# Patient Record
Sex: FEMALE | Race: WHITE | NOT HISPANIC OR LATINO | Employment: FULL TIME | URBAN - NONMETROPOLITAN AREA
[De-identification: names, ages, dates, MRNs, and addresses within clinical notes are randomized per-mention and may not be internally consistent; named-entity substitution may affect disease eponyms.]

---

## 2017-03-17 ENCOUNTER — OFFICE VISIT (OUTPATIENT)
Dept: INTERNAL MEDICINE | Facility: CLINIC | Age: 37
End: 2017-03-17

## 2017-03-17 VITALS
TEMPERATURE: 99 F | HEIGHT: 66 IN | DIASTOLIC BLOOD PRESSURE: 66 MMHG | HEART RATE: 78 BPM | OXYGEN SATURATION: 98 % | WEIGHT: 186 LBS | SYSTOLIC BLOOD PRESSURE: 118 MMHG | BODY MASS INDEX: 29.89 KG/M2

## 2017-03-17 DIAGNOSIS — R42 DIZZINESS: Primary | ICD-10-CM

## 2017-03-17 DIAGNOSIS — R13.12 OROPHARYNGEAL DYSPHAGIA: ICD-10-CM

## 2017-03-17 DIAGNOSIS — J30.2 SEASONAL ALLERGIC RHINITIS, UNSPECIFIED ALLERGIC RHINITIS TRIGGER: ICD-10-CM

## 2017-03-17 PROCEDURE — 99203 OFFICE O/P NEW LOW 30 MIN: CPT | Performed by: INTERNAL MEDICINE

## 2017-03-17 PROCEDURE — 93000 ELECTROCARDIOGRAM COMPLETE: CPT | Performed by: INTERNAL MEDICINE

## 2017-03-17 NOTE — PROGRESS NOTES
"Chief Complaint   Patient presents with   • Establish Care     with Dr. Vermeesch today. Pt states she's been having some dizzy spells and moments she is unable to swallow.      Subjective   Xuan Baron is a 36 y.o. female.     HPI Comments: Patient is here to be established today.  Past medical history of anemia, ovarian cyst and kidney infection.  Past surgical history of cervical ablation in , partial hysterectomy with ovaries in place in May 2016.  SH, FH, meds and allergies reviewed.   HCM:  Eye exam in , Pap smear in May 2016.  She has been feeling some dizziness lately.  Had an episode of feeling fuzzy as though she may pass out at work.  Episode lasted a few minutes.   This was after breakfast.  She started to lose focus and felt she may pass out. There was no associated HA, CP or SOB.  No palpitations.   This has occurred about 5 times in past two months.  Of note, her mother  of myocardial infarction at age 64.  On occasion while eating she starts to have difficulty swallowing.  It occurs with solids and liquids.  It does not feel like food is \"stuck\", it feels like her muscles are not moving to swallow.   She has been having LBP for about a yr.  Has had no trauma.  It often awakens her at night, worse than during the day.    She does get recurrent UTIs, these tend to run in the family.  Has left lateral breast tenderness.  She cannot palpate a mass.  There is no family history of breast cancer.  She has never had a mammogram.  She drinks 4-5 cups of coffee a day.         The following portions of the patient's history were reviewed and updated as appropriate: allergies, current medications, past family history, past medical history, past social history, past surgical history and problem list.    Review of Systems   Constitutional: Negative for unexpected weight change.   HENT: Positive for congestion, postnasal drip and trouble swallowing.    Respiratory: Negative for chest tightness and " "shortness of breath.    Cardiovascular: Negative for chest pain and palpitations.   Endocrine:        Left lateral breast tenderness   Genitourinary:        Recurrent urinary tract infections   Musculoskeletal: Positive for back pain.   Allergic/Immunologic: Positive for environmental allergies.   Neurological: Positive for dizziness. Negative for seizures, syncope, speech difficulty, weakness and headaches.        Near syncope   All other systems reviewed and are negative.      Objective   Visit Vitals   • /66   • Pulse 78   • Temp 99 °F (37.2 °C)   • Ht 66\" (167.6 cm)   • Wt 186 lb (84.4 kg)   • SpO2 98%   • BMI 30.02 kg/m2     Body mass index is 30.02 kg/(m^2).  Physical Exam   Constitutional: She is oriented to person, place, and time. She appears well-developed and well-nourished.   HENT:   Head: Normocephalic and atraumatic.   Mouth/Throat: Oropharynx is clear and moist.   Tympanic membranes dull bilaterally, oropharynx with clear postnasal drip   Eyes: Conjunctivae and EOM are normal. Pupils are equal, round, and reactive to light.   Neck: Normal range of motion. Neck supple. No thyromegaly present.   Cardiovascular: Normal rate, regular rhythm, normal heart sounds and intact distal pulses.    No murmur heard.  Pulmonary/Chest: Effort normal and breath sounds normal. No respiratory distress.   Abdominal: Soft. Bowel sounds are normal.   Musculoskeletal: She exhibits no edema.   Lymphadenopathy:     She has no cervical adenopathy.   Neurological: She is alert and oriented to person, place, and time. She has normal reflexes. No cranial nerve deficit.   Muscle strength normal in upper and lower extremities   Psychiatric: She has a normal mood and affect. Her behavior is normal. Judgment and thought content normal.   Nursing note and vitals reviewed.      ECG 12 Lead  Date/Time: 3/17/2017 5:26 PM  Performed by: VERMEESCH, MARILYN K  Authorized by: VERMEESCH, MARILYN K   Rhythm: sinus rhythm  Rate: " normal  BPM: 75  Conduction: conduction normal  ST Segments: ST segments normal  T Waves: T waves normal  QRS axis: normal  Other: no other findings  Clinical impression: normal ECG            Assessment/Plan   Xuan Baron is here today and the following problems have been addressed:      Xuan was seen today for establish care.    Diagnoses and all orders for this visit:    Dizziness  -     CBC & Differential; Future  -     Comprehensive Metabolic Panel; Future  -     TSH; Future    Oropharyngeal dysphagia  -     CBC & Differential; Future  -     Comprehensive Metabolic Panel; Future  -     TSH; Future  -     FL upper gi single contrast wo kub; Future    Seasonal allergic rhinitis, unspecified allergic rhinitis trigger      Labs as noted  Upper GI to evaluate dysphagia  EKG is unremarkable  Recommend patient start Zyrtec 10 mg by mouth daily at bedtime due to congestion in ears from allergies  Will address left lateral breast pain at next visit and do possible mammogram  At this time recommend patient decrease amount of caffeine she is consuming    Return to clinic in 2 weeks    Please note that portions of this note were completed with a voice recognition program.  Efforts were made to edit dictation, but occasionally words are mistranscribed.

## 2017-03-20 ENCOUNTER — LAB (OUTPATIENT)
Dept: INTERNAL MEDICINE | Facility: CLINIC | Age: 37
End: 2017-03-20

## 2017-03-20 DIAGNOSIS — R13.12 OROPHARYNGEAL DYSPHAGIA: ICD-10-CM

## 2017-03-20 DIAGNOSIS — R42 DIZZINESS: ICD-10-CM

## 2017-03-20 LAB
ALBUMIN SERPL-MCNC: 4.2 G/DL (ref 3.2–4.8)
ALBUMIN/GLOB SERPL: 1.7 G/DL (ref 1.5–2.5)
ALP SERPL-CCNC: 50 U/L (ref 25–100)
ALT SERPL W P-5'-P-CCNC: 21 U/L (ref 7–40)
ANION GAP SERPL CALCULATED.3IONS-SCNC: 3 MMOL/L (ref 3–11)
AST SERPL-CCNC: 20 U/L (ref 0–33)
BASOPHILS # BLD AUTO: 0.01 10*3/MM3 (ref 0–0.2)
BASOPHILS NFR BLD AUTO: 0.2 % (ref 0–1)
BILIRUB SERPL-MCNC: 0.3 MG/DL (ref 0.3–1.2)
BUN BLD-MCNC: 10 MG/DL (ref 9–23)
BUN/CREAT SERPL: 12.5 (ref 7–25)
CALCIUM SPEC-SCNC: 9.2 MG/DL (ref 8.7–10.4)
CHLORIDE SERPL-SCNC: 110 MMOL/L (ref 99–109)
CO2 SERPL-SCNC: 27 MMOL/L (ref 20–31)
CREAT BLD-MCNC: 0.8 MG/DL (ref 0.6–1.3)
DEPRECATED RDW RBC AUTO: 40.6 FL (ref 37–54)
EOSINOPHIL # BLD AUTO: 0.1 10*3/MM3 (ref 0.1–0.3)
EOSINOPHIL NFR BLD AUTO: 2.3 % (ref 0–3)
ERYTHROCYTE [DISTWIDTH] IN BLOOD BY AUTOMATED COUNT: 13.1 % (ref 11.3–14.5)
GFR SERPL CREATININE-BSD FRML MDRD: 81 ML/MIN/1.73
GLOBULIN UR ELPH-MCNC: 2.5 GM/DL
GLUCOSE BLD-MCNC: 85 MG/DL (ref 70–100)
HCT VFR BLD AUTO: 38.3 % (ref 34.5–44)
HGB BLD-MCNC: 12.7 G/DL (ref 11.5–15.5)
IMM GRANULOCYTES # BLD: 0.01 10*3/MM3 (ref 0–0.03)
IMM GRANULOCYTES NFR BLD: 0.2 % (ref 0–0.6)
LYMPHOCYTES # BLD AUTO: 1.6 10*3/MM3 (ref 0.6–4.8)
LYMPHOCYTES NFR BLD AUTO: 37.5 % (ref 24–44)
MCH RBC QN AUTO: 28.2 PG (ref 27–31)
MCHC RBC AUTO-ENTMCNC: 33.2 G/DL (ref 32–36)
MCV RBC AUTO: 85.1 FL (ref 80–99)
MONOCYTES # BLD AUTO: 0.4 10*3/MM3 (ref 0–1)
MONOCYTES NFR BLD AUTO: 9.4 % (ref 0–12)
NEUTROPHILS # BLD AUTO: 2.15 10*3/MM3 (ref 1.5–8.3)
NEUTROPHILS NFR BLD AUTO: 50.4 % (ref 41–71)
PLATELET # BLD AUTO: 179 10*3/MM3 (ref 150–450)
PMV BLD AUTO: 10.9 FL (ref 6–12)
POTASSIUM BLD-SCNC: 4.5 MMOL/L (ref 3.5–5.5)
PROT SERPL-MCNC: 6.7 G/DL (ref 5.7–8.2)
RBC # BLD AUTO: 4.5 10*6/MM3 (ref 3.89–5.14)
SODIUM BLD-SCNC: 140 MMOL/L (ref 132–146)
TSH SERPL DL<=0.05 MIU/L-ACNC: 1.88 MIU/ML (ref 0.35–5.35)
WBC NRBC COR # BLD: 4.27 10*3/MM3 (ref 3.5–10.8)

## 2017-03-20 PROCEDURE — 80053 COMPREHEN METABOLIC PANEL: CPT | Performed by: INTERNAL MEDICINE

## 2017-03-20 PROCEDURE — 84443 ASSAY THYROID STIM HORMONE: CPT | Performed by: INTERNAL MEDICINE

## 2017-03-20 PROCEDURE — 85025 COMPLETE CBC W/AUTO DIFF WBC: CPT | Performed by: INTERNAL MEDICINE

## 2017-03-20 PROCEDURE — 36415 COLL VENOUS BLD VENIPUNCTURE: CPT | Performed by: INTERNAL MEDICINE

## 2017-03-22 ENCOUNTER — HOSPITAL ENCOUNTER (OUTPATIENT)
Dept: GENERAL RADIOLOGY | Facility: HOSPITAL | Age: 37
Discharge: HOME | End: 2017-03-22
Admitting: INTERNAL MEDICINE

## 2017-03-22 ENCOUNTER — TELEPHONE (OUTPATIENT)
Dept: INTERNAL MEDICINE | Facility: CLINIC | Age: 37
End: 2017-03-22

## 2017-03-22 DIAGNOSIS — R13.12 OROPHARYNGEAL DYSPHAGIA: ICD-10-CM

## 2017-03-22 PROCEDURE — 74240 X-RAY XM UPR GI TRC 1CNTRST: CPT

## 2017-03-22 NOTE — TELEPHONE ENCOUNTER
----- Message from Marilyn K Vermeesch, MD sent at 3/22/2017 12:19 PM EDT -----  Please tell patient up her GI reveals mild GERD symptoms.  I would like her to take over-the-counter Zantac 150 mg once daily at suppertime.

## 2017-03-31 ENCOUNTER — OFFICE VISIT (OUTPATIENT)
Dept: INTERNAL MEDICINE | Facility: CLINIC | Age: 37
End: 2017-03-31

## 2017-03-31 VITALS
SYSTOLIC BLOOD PRESSURE: 116 MMHG | DIASTOLIC BLOOD PRESSURE: 68 MMHG | OXYGEN SATURATION: 94 % | HEART RATE: 68 BPM | WEIGHT: 185.38 LBS | TEMPERATURE: 98.1 F | BODY MASS INDEX: 29.79 KG/M2 | HEIGHT: 66 IN

## 2017-03-31 DIAGNOSIS — K21.9 GASTROESOPHAGEAL REFLUX DISEASE WITHOUT ESOPHAGITIS: Primary | ICD-10-CM

## 2017-03-31 PROCEDURE — 99213 OFFICE O/P EST LOW 20 MIN: CPT | Performed by: INTERNAL MEDICINE

## 2017-03-31 RX ORDER — RANITIDINE 150 MG/1
150 TABLET ORAL 2 TIMES DAILY
Start: 2017-03-31 | End: 2018-06-07

## 2017-03-31 NOTE — PROGRESS NOTES
"Chief Complaint   Patient presents with   • Follow-up     2 weeks for dizziness and trouble swallowing. Pt states there hasn't been any change.      Subjective   Xuan Baron is a 36 y.o. female.     HPI Comments: Here for follow-up of dizziness and intermittent difficulty with swallowing.  Upper GI revealed mild GERD  to distal esophagus.  She cannot really tell if she has any improvement with her swallow, has only been on the zantac for a week.  She has had an occasional episode of feeling as though the floor moves when she initially stands.  This may last for a few minutes.  No associated sxs when this occurs.  She does not feel bad afterwards.   Has been taking antihistamine on and off.        The following portions of the patient's history were reviewed and updated as appropriate: allergies, current medications, past family history, past medical history, past social history, past surgical history and problem list.    Review of Systems   HENT: Positive for trouble swallowing.    Allergic/Immunologic: Positive for environmental allergies.   Neurological: Positive for light-headedness.       Objective   /68  Pulse 68  Temp 98.1 °F (36.7 °C)  Ht 66\" (167.6 cm)  Wt 185 lb 6 oz (84.1 kg)  SpO2 94%  BMI 29.92 kg/m2  Body mass index is 29.92 kg/(m^2).  Physical Exam   Constitutional: She is oriented to person, place, and time. She appears well-developed and well-nourished.   HENT:   Head: Normocephalic and atraumatic.   Eyes: EOM are normal. Pupils are equal, round, and reactive to light.   Neurological: She is alert and oriented to person, place, and time.   Psychiatric: She has a normal mood and affect. Judgment normal.   Nursing note and vitals reviewed.      Assessment/Plan   Xuan Baron is here today and the following problems have been addressed:      Xuan was seen today for follow-up.    Diagnoses and all orders for this visit:    Gastroesophageal reflux disease without esophagitis    Other " "orders  -     raNITIdine (ZANTAC) 150 MG tablet; Take 1 tablet by mouth 2 (Two) Times a Day.    Continue zantac 150 mg q day  Avoid late night meals, spicy food to prevent worsening GERD sxs  She is having less episodes of \"dizziness\"/lightheadedness- will monitor for now  Continue when necessary antihistamine for allergies if needed    RTC 2 mo  Please note that portions of this note were completed with a voice recognition program.  Efforts were made to edit dictation, but occasionally words are mistranscribed.  "

## 2017-04-20 ENCOUNTER — OFFICE VISIT (OUTPATIENT)
Dept: INTERNAL MEDICINE | Facility: CLINIC | Age: 37
End: 2017-04-20

## 2017-04-20 ENCOUNTER — RESULTS ENCOUNTER (OUTPATIENT)
Dept: INTERNAL MEDICINE | Facility: CLINIC | Age: 37
End: 2017-04-20

## 2017-04-20 VITALS — OXYGEN SATURATION: 98 % | HEART RATE: 78 BPM | TEMPERATURE: 98.7 F

## 2017-04-20 DIAGNOSIS — R39.9 URINARY SYMPTOM OR SIGN: Primary | ICD-10-CM

## 2017-04-20 DIAGNOSIS — N30.00 ACUTE CYSTITIS WITHOUT HEMATURIA: ICD-10-CM

## 2017-04-20 LAB
BILIRUB BLD-MCNC: ABNORMAL MG/DL
CLARITY, POC: CLEAR
COLOR UR: ABNORMAL
GLUCOSE UR STRIP-MCNC: NEGATIVE MG/DL
KETONES UR QL: NEGATIVE
LEUKOCYTE EST, POC: NEGATIVE
NITRITE UR-MCNC: POSITIVE MG/ML
PH UR: 7 [PH] (ref 5–8)
PROT UR STRIP-MCNC: ABNORMAL MG/DL
RBC # UR STRIP: NEGATIVE /UL
SP GR UR: 1.01 (ref 1–1.03)
UROBILINOGEN UR QL: ABNORMAL

## 2017-04-20 PROCEDURE — 99213 OFFICE O/P EST LOW 20 MIN: CPT | Performed by: INTERNAL MEDICINE

## 2017-04-20 PROCEDURE — 81003 URINALYSIS AUTO W/O SCOPE: CPT | Performed by: INTERNAL MEDICINE

## 2017-04-20 RX ORDER — NITROFURANTOIN 25; 75 MG/1; MG/1
100 CAPSULE ORAL 2 TIMES DAILY
Qty: 14 CAPSULE | Refills: 0 | Status: SHIPPED | OUTPATIENT
Start: 2017-04-20 | End: 2017-07-03 | Stop reason: SDUPTHER

## 2017-04-20 NOTE — PROGRESS NOTES
Chief Complaint   Patient presents with   • Urinary Tract Infection     Pt states she been having some abdominal pressure and burning with urination.      Subjective   Xuan Baron is a 36 y.o. female.     HPI Comments: Here with complaints of abdominal pressure and pain with urination.   She has had pressure and burning for about one day.  She has had some chills and body aches.  No back pain.  Some diarrhea.  No NV.   She had the flu last week.  She had a hysterectomy a yr ago.  Since then she has had a UTI every other month.  She has always had a lot of UTIs.  This runs in the women in her family.       The following portions of the patient's history were reviewed and updated as appropriate: allergies, current medications, past family history, past medical history, past social history, past surgical history and problem list.    Review of Systems   Constitutional: Positive for chills. Negative for fever.   Gastrointestinal: Positive for abdominal pain and diarrhea.   Genitourinary: Positive for difficulty urinating and dysuria.   Musculoskeletal: Positive for myalgias.       Objective   Pulse 78  Temp 98.7 °F (37.1 °C)  SpO2 98%  There is no height or weight on file to calculate BMI.  Physical Exam   Constitutional: She is oriented to person, place, and time. She appears well-developed and well-nourished.   HENT:   Head: Normocephalic and atraumatic.   Cardiovascular: Normal rate, regular rhythm and normal heart sounds.    Pulmonary/Chest: Effort normal and breath sounds normal.   Abdominal: Soft. Bowel sounds are normal.   Suprapubic tenderness, no CVAT   Neurological: She is alert and oriented to person, place, and time.   Psychiatric: She has a normal mood and affect. Judgment normal.   Nursing note and vitals reviewed.      Assessment/Plan   Xuan Baron is here today and the following problems have been addressed:      Xuan was seen today for urinary tract infection.    Diagnoses and all orders for this  visit:    Urinary symptom or sign  -     POCT urinalysis dipstick, automated    Acute cystitis without hematuria  -     Urine culture (clean catch); Future  -     Ambulatory Referral to Urology    Other orders  -     nitrofurantoin, macrocrystal-monohydrate, (MACROBID) 100 MG capsule; Take 1 capsule by mouth 2 (Two) Times a Day.    UA with positive nitrites, UC sent  Increase fluids and rest  Given macrobid for 7 days  Referred to urologist due to recurrent UTI    RTC prn  Please note that portions of this note were completed with a voice recognition program.  Efforts were made to edit dictation, but occasionally words are mistranscribed.

## 2017-04-23 LAB
BACTERIA UR CULT: NORMAL
BACTERIA UR CULT: NORMAL

## 2017-06-19 ENCOUNTER — OFFICE VISIT (OUTPATIENT)
Dept: UROLOGY | Facility: CLINIC | Age: 37
End: 2017-06-19

## 2017-06-19 VITALS
BODY MASS INDEX: 29.41 KG/M2 | HEIGHT: 66 IN | WEIGHT: 183 LBS | HEART RATE: 64 BPM | TEMPERATURE: 98.1 F | DIASTOLIC BLOOD PRESSURE: 65 MMHG | SYSTOLIC BLOOD PRESSURE: 102 MMHG | OXYGEN SATURATION: 96 %

## 2017-06-19 DIAGNOSIS — N39.0 RECURRENT UTI: Primary | ICD-10-CM

## 2017-06-19 DIAGNOSIS — N32.81 OVERACTIVE BLADDER: ICD-10-CM

## 2017-06-19 LAB
BILIRUB BLD-MCNC: NEGATIVE MG/DL
CLARITY, POC: CLEAR
COLOR UR: YELLOW
GLUCOSE UR STRIP-MCNC: NEGATIVE MG/DL
KETONES UR QL: NEGATIVE
LEUKOCYTE EST, POC: NEGATIVE
NITRITE UR-MCNC: NEGATIVE MG/ML
PH UR: 7 [PH] (ref 5–8)
PROT UR STRIP-MCNC: NEGATIVE MG/DL
RBC # UR STRIP: NEGATIVE /UL
SP GR UR: 1.01 (ref 1–1.03)
UROBILINOGEN UR QL: NORMAL

## 2017-06-19 PROCEDURE — 99203 OFFICE O/P NEW LOW 30 MIN: CPT | Performed by: UROLOGY

## 2017-06-19 PROCEDURE — 52265 CYSTOSCOPY AND TREATMENT: CPT | Performed by: UROLOGY

## 2017-06-19 PROCEDURE — 81003 URINALYSIS AUTO W/O SCOPE: CPT | Performed by: UROLOGY

## 2017-06-19 RX ORDER — PREDNISOLONE ACETATE 10 MG/ML
SUSPENSION/ DROPS OPHTHALMIC
Refills: 0 | COMMUNITY
Start: 2017-06-16 | End: 2018-06-07

## 2017-06-19 RX ORDER — PHENAZOPYRIDINE HYDROCHLORIDE 200 MG/1
TABLET, FILM COATED ORAL
Refills: 0 | COMMUNITY
Start: 2017-06-09 | End: 2017-07-03 | Stop reason: SDUPTHER

## 2017-06-19 NOTE — PROGRESS NOTES
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  Its not clear of any of these were documented but a culture done in April was basically negative on voided urine sample.  She has the typical symptoms of frequency urgency and dysuria that are typically treated with antibiotics but not documented with cultures.  She does admit to drinking a fair amount of caffeine with 2 cups of coffee in the morning 2 glasses of tea in the evening and drinking lots of water.  Discomfort and occasionally after intercourse.  She denies any gross hematuria back pain or febrile infections that would indicate upper tract involvement.  She states she underwent a hysterectomy by Dr. Swenson for pelvic pain although it hasn't helped that much.  He apparently did a bladder tack at the time but she continues to have stress incontinence when she is running.    Vitals:    06/19/17 1517   BP: 102/65   Pulse: 64   Temp: 98.1 °F (36.7 °C)   SpO2: 96%       Past Medical History  Past Medical History:   Diagnosis Date   • Anemia    • Kidney infection    • Ovarian cyst        Past Surgical History  Past Surgical History:   Procedure Laterality Date   • LASER ABLATION OF THE CERVIX     • PARTIAL HYSTERECTOMY         Medications  has a current medication list which includes the following prescription(s): nitrofurantoin (macrocrystal-monohydrate), phenazopyridine, prednisolone acetate, and ranitidine.    Allergies  No Known Allergies    Social History  Social History     Social History   • Marital status:      Spouse name: N/A   • Number of children: N/A   • Years of education: N/A     Occupational History   •  [employer]     Social History Main Topics   • Smoking status: Never Smoker   • Smokeless tobacco: Never Used   • Alcohol use No    • Drug use: No   • Sexual activity: Defer     Other Topics Concern   • Not on file     Social History Narrative       Family History  Family History   Problem Relation Age of Onset   • Heart attack Mother    • Stroke Mother    • Thyroid disease Mother    • Diabetes Father    • Heart attack Father    • Hypertension Father    • Obesity Father    • Migraines Son        Review of Systems  Review of Systems  Positive for frequency urgency but negative in all other categories.  Physical Exam  Physical Exam   Constitutional: She is oriented to person, place, and time. She appears well-developed and well-nourished.   HENT:   Head: Normocephalic.   Eyes: EOM are normal. Pupils are equal, round, and reactive to light.   Neck: Normal range of motion. Neck supple.   Cardiovascular: Normal rate, regular rhythm and normal heart sounds.    Pulmonary/Chest: Effort normal.   Abdominal: Soft. Bowel sounds are normal. Hernia confirmed negative in the right inguinal area and confirmed negative in the left inguinal area.   Genitourinary: Vagina normal. Pelvic exam was performed with patient supine.       Lymphadenopathy:        Right: No inguinal adenopathy present.        Left: No inguinal adenopathy present.   Neurological: She is alert and oriented to person, place, and time.   Skin: Skin is warm and dry.   Psychiatric: She has a normal mood and affect.       Labs recent and today in the office:  Results for orders placed or performed in visit on 06/19/17   POC Urinalysis Dipstick, Automated   Result Value Ref Range    Color Yellow Yellow, Straw, Dark Yellow, Anayeli    Clarity, UA Clear Clear    Glucose, UA Negative Negative, 1000 mg/dL (3+) mg/dL    Bilirubin Negative Negative    Ketones, UA Negative Negative    Specific Gravity  1.015 1.005 - 1.030    Blood, UA Negative Negative    pH, Urine 7.0 5.0 - 8.0    Protein, POC Negative Negative mg/dL    Urobilinogen, UA Normal Normal    Leukocytes Negative Negative    Nitrite, UA  Negative Negative      Female cystoscopy:     The patient is prepped and draped in the dorsal lithotomy position in a routine sterile fashion. The vulva and urethral meatus are inspected and found to be normal. The panendoscope is inserted in the bladder which is visualized with the Foroblique and 70 degree lenses and found to be free of foreign bodies and mucosal lesions. Ureteral orifices are normal location and effluxing clear urine bilaterally.  There are only 2 ounces of postvoid residual.  The bladder is distended to 500 mL and a Tim test is negative with vigorous coughing.  On reinspection there are no signs of interstitial cystitis stones polyps or diverticula.  Pelvic exam is post hysterectomy and benign.    Assessment & Plan  I suspected interstitial cystitis but there are no signs of it on office cystoscopy.  Pelvic exam is negative for inflammation masses or even tenderness.  She has only 1 ounce of postvoid residual so there is really no signs of pathology with the current data.  I suggested she bring a urine specimen by anytime that she suspects an infection so we can build the portfolio of data.  If bacterial infections can be documented she would be a good candidate for chronic suppression with Macrodantin.  For now I recommend caffeine restriction and reducing fluid intake near bedtime.

## 2017-07-03 ENCOUNTER — LAB REQUISITION (OUTPATIENT)
Dept: LAB | Facility: HOSPITAL | Age: 37
End: 2017-07-03

## 2017-07-03 DIAGNOSIS — N39.0 URINARY TRACT INFECTION: ICD-10-CM

## 2017-07-03 DIAGNOSIS — N30.00 ACUTE CYSTITIS WITHOUT HEMATURIA: Primary | ICD-10-CM

## 2017-07-03 PROCEDURE — 87086 URINE CULTURE/COLONY COUNT: CPT | Performed by: UROLOGY

## 2017-07-03 RX ORDER — PHENAZOPYRIDINE HYDROCHLORIDE 200 MG/1
200 TABLET, FILM COATED ORAL 3 TIMES DAILY PRN
Qty: 30 TABLET | Refills: 1 | Status: SHIPPED | OUTPATIENT
Start: 2017-07-03 | End: 2018-06-07

## 2017-07-03 RX ORDER — NITROFURANTOIN 25; 75 MG/1; MG/1
100 CAPSULE ORAL 2 TIMES DAILY
Qty: 14 CAPSULE | Refills: 0 | Status: SHIPPED | OUTPATIENT
Start: 2017-07-03 | End: 2018-06-07

## 2017-07-05 LAB — BACTERIA SPEC AEROBE CULT: NO GROWTH

## 2018-07-09 ENCOUNTER — HOSPITAL ENCOUNTER (OUTPATIENT)
Dept: GENERAL RADIOLOGY | Facility: HOSPITAL | Age: 38
Discharge: HOME | End: 2018-07-09
Admitting: PHYSICIAN ASSISTANT

## 2018-07-09 ENCOUNTER — OFFICE VISIT (OUTPATIENT)
Dept: INTERNAL MEDICINE | Facility: CLINIC | Age: 38
End: 2018-07-09

## 2018-07-09 VITALS
OXYGEN SATURATION: 99 % | BODY MASS INDEX: 25.07 KG/M2 | TEMPERATURE: 98.5 F | HEIGHT: 66 IN | DIASTOLIC BLOOD PRESSURE: 70 MMHG | HEART RATE: 90 BPM | WEIGHT: 156 LBS | SYSTOLIC BLOOD PRESSURE: 114 MMHG

## 2018-07-09 DIAGNOSIS — M53.3 CHRONIC SI JOINT PAIN: Primary | ICD-10-CM

## 2018-07-09 DIAGNOSIS — M54.50 LOW BACK PAIN AT MULTIPLE SITES: Primary | ICD-10-CM

## 2018-07-09 DIAGNOSIS — G89.29 CHRONIC SI JOINT PAIN: Primary | ICD-10-CM

## 2018-07-09 PROCEDURE — 99213 OFFICE O/P EST LOW 20 MIN: CPT | Performed by: PHYSICIAN ASSISTANT

## 2018-07-09 PROCEDURE — 72114 X-RAY EXAM L-S SPINE BENDING: CPT

## 2018-07-09 RX ORDER — FEXOFENADINE HCL 180 MG/1
180 TABLET ORAL DAILY
Refills: 0 | COMMUNITY
Start: 2018-04-08 | End: 2020-07-13

## 2018-07-09 RX ORDER — NAPROXEN 500 MG/1
500 TABLET ORAL 2 TIMES DAILY WITH MEALS
COMMUNITY
End: 2020-07-13

## 2018-07-09 NOTE — PROGRESS NOTES
Xuan Baron is a 38 y.o. female.     Subjective   History of Present Illness   Here today with concern of several years of sudden intermittent low back pain triggered by rolling over in bed. She describes the pain as sharp and debilitating until it resolves after a minute or so.    A few weeks ago she increased her exercise content and noticed some increased constant low back discomfort which she contributed to muscle soreness. About 1 week ago after doing yard work she had pain with standing straight up which has been constant for 1 week. Transitioning from sitting to standing causes increased pain into her sacrum. Pain radiates to bilateral low back, worse on right. Pain does not radiate to her legs and she denies numbness, tingling or weakness in her legs.         The following portions of the patient's history were reviewed and updated as appropriate: allergies, current medications, past family history, past medical history, past social history, past surgical history and problem list.    Review of Systems    Constitutional: Negative for appetite change, chills, fatigue, fever and unexpected weight change.   HENT: Negative for congestion, ear pain, hearing loss, nosebleeds, postnasal drip, rhinorrhea, sore throat, tinnitus and trouble swallowing.    Eyes: Negative for photophobia, discharge and visual disturbance.   Respiratory: Negative for cough, chest tightness, shortness of breath and wheezing.    Cardiovascular: Negative for chest pain, palpitations and leg swelling.   Gastrointestinal: Negative for abdominal distention, abdominal pain, blood in stool, constipation, diarrhea, nausea and vomiting.   Endocrine: Negative for cold intolerance, heat intolerance, polydipsia, polyphagia and polyuria.   Musculoskeletal: back pain. Negative for arthralgias, joint swelling, myalgias, neck pain and neck stiffness.   Skin: Negative for color change, pallor, rash and wound.   Allergic/Immunologic: Negative for  "environmental allergies, food allergies and immunocompromised state.   Neurological: Negative for dizziness, tremors, seizures, weakness, numbness and headaches.   Hematological: Negative for adenopathy. Does not bruise/bleed easily.   Psychiatric/Behavioral: Negative for sleep disturbances, agitation, behavioral problems, confusion, hallucinations, self-injury and suicidal ideas. The patient is not nervous/anxious.      Objective    Physical Exam  Constitutional: No acute distress. Appears well-developed and well-nourished.    Cardiovascular: Normal rate, regular rhythm and normal heart sounds.    Pulmonary/Chest: Effort normal and breath sounds normal. No respiratory distress.  Has no wheezes or rales. Exhibits no chest wall tenderness.    Musculoskeletal: tenderness bilateral SI joints and slight tenderness L3-S1 vertebrae. Normal range of motion.  Neurological: Negative straight leg raise bilaterally. Alert and oriented to person, place, and time.   Skin: Skin is warm and dry.   Psychiatric: Has a normal mood and affect. Behavior is normal. Judgment and thought content normal.       /70   Pulse 90   Temp 98.5 °F (36.9 °C)   Ht 166.4 cm (65.51\")   Wt 70.8 kg (156 lb)   SpO2 99%   BMI 25.56 kg/m²     Nursing note and vitals reviewed.          Assessment/Plan   Xuan was seen today for back pain.    Diagnoses and all orders for this visit:    Low back pain at multiple sites  -     XR Spine Lumbar Complete With Flex & Ext      Suspect SI joint dysfunction and/or arthritis. Will refer to orthopedic surgeon to discuss potential SI joint injections if warranted after review of x-rays.          "

## 2019-01-22 ENCOUNTER — OFFICE VISIT (OUTPATIENT)
Dept: INTERNAL MEDICINE | Facility: CLINIC | Age: 39
End: 2019-01-22

## 2019-01-22 VITALS
WEIGHT: 144 LBS | HEART RATE: 68 BPM | HEIGHT: 66 IN | OXYGEN SATURATION: 99 % | DIASTOLIC BLOOD PRESSURE: 72 MMHG | SYSTOLIC BLOOD PRESSURE: 120 MMHG | TEMPERATURE: 99.8 F | BODY MASS INDEX: 23.14 KG/M2

## 2019-01-22 DIAGNOSIS — J30.2 SEASONAL ALLERGIC RHINITIS, UNSPECIFIED TRIGGER: ICD-10-CM

## 2019-01-22 DIAGNOSIS — R06.83 SNORING: ICD-10-CM

## 2019-01-22 DIAGNOSIS — Z00.00 PHYSICAL EXAM: Primary | ICD-10-CM

## 2019-01-22 DIAGNOSIS — I73.00 RAYNAUD'S PHENOMENON WITHOUT GANGRENE: ICD-10-CM

## 2019-01-22 PROBLEM — R42 DIZZINESS: Status: RESOLVED | Noted: 2017-03-17 | Resolved: 2019-01-22

## 2019-01-22 PROBLEM — N30.00 ACUTE CYSTITIS WITHOUT HEMATURIA: Status: RESOLVED | Noted: 2017-04-20 | Resolved: 2019-01-22

## 2019-01-22 PROBLEM — R13.12 OROPHARYNGEAL DYSPHAGIA: Status: RESOLVED | Noted: 2017-03-17 | Resolved: 2019-01-22

## 2019-01-22 PROCEDURE — 99395 PREV VISIT EST AGE 18-39: CPT | Performed by: INTERNAL MEDICINE

## 2019-01-22 NOTE — PROGRESS NOTES
Chief Complaint   Patient presents with   • Annual Exam     Physical only. Pt states she is tired all the time. Pt states she's having issues with her finger tips and toes, they go numb and turn white.      Subjective   Xuan Baron is a 38 y.o. female.     Here today for annual PE.   PMH of allergies and GERD.   She has some chronic allergies.  No acute illness.  She does not take meds regularly, takes ese seltzer sinus if needed.    She is not having any GERD sxs at this time.    She does have some chronic fatigue.    Has some chronic LBP due to sitting in a chair all day.  She uses a support in her chair.  Has taken naprosyn and tries flexeril but it makes her tired.  She takes it at night, she tosses and turns all night.  Her  says she snores all night terribly.  She also has restless legs at night.   She has white finger tips and toes.  It is usually cold outside when this occurs.  It occasionally occurs in the house.  She has back pain but no other joint pains.  No rashes.  No FH of lupus or RA.  No other FH of Raynauds.    HCM- she will get flu shot today.  Has had Hep A vaccine.  Of note, she does have a low-grade fever with some current mild URI symptoms.  No FH of breast or colon cancer.   She sees eye doctor regularly, sees dentist twice a yr.   She wears seartbelt and she does text and drive on occasion.   She eats a healthy diet overall.           The following portions of the patient's history were reviewed and updated as appropriate: allergies, current medications, past family history, past medical history, past social history, past surgical history and problem list.    Review of Systems   Constitutional: Positive for fatigue. Negative for activity change, appetite change and unexpected weight change.   HENT: Positive for congestion and postnasal drip.    Respiratory: Negative for shortness of breath.         Snoring at night   Cardiovascular: Negative for chest pain, palpitations and leg  "swelling.   Gastrointestinal: Negative for abdominal pain.   Genitourinary: Negative for menstrual problem.   Musculoskeletal: Positive for back pain.   Skin: Positive for color change.   Allergic/Immunologic: Positive for environmental allergies.   Neurological: Negative for headaches.   Psychiatric/Behavioral: Positive for sleep disturbance. Negative for dysphoric mood. The patient is not nervous/anxious.    All other systems reviewed and are negative.      Objective   /72   Pulse 68   Temp 99.8 °F (37.7 °C)   Ht 166.4 cm (65.51\")   Wt 65.3 kg (144 lb)   SpO2 99%   BMI 23.59 kg/m²   Body mass index is 23.59 kg/m².  Physical Exam   Constitutional: She is oriented to person, place, and time. She appears well-developed and well-nourished. No distress.   Very pleasant female in no obvious distress today   HENT:   Head: Normocephalic and atraumatic.   Right Ear: External ear normal.   Left Ear: External ear normal.   Mouth/Throat: Oropharynx is clear and moist.   Eyes: Conjunctivae and EOM are normal. Pupils are equal, round, and reactive to light.   Neck: Normal range of motion. Neck supple. No thyromegaly present.   Cardiovascular: Normal rate, regular rhythm, normal heart sounds and intact distal pulses.   No murmur heard.  No carotid bruits   Pulmonary/Chest: Effort normal and breath sounds normal. No respiratory distress. She has no wheezes.   Abdominal: Soft. Bowel sounds are normal. She exhibits no distension. There is no tenderness.   Musculoskeletal: Normal range of motion. She exhibits no edema.   Lymphadenopathy:     She has no cervical adenopathy.   Neurological: She is alert and oriented to person, place, and time. No cranial nerve deficit. Coordination normal.   Psychiatric: She has a normal mood and affect. Her behavior is normal. Judgment and thought content normal.   Nursing note and vitals reviewed.      Assessment/Plan   Xuan Baron is here today and the following problems have been " addressed:      Xuan was seen today for annual exam.    Diagnoses and all orders for this visit:    Physical exam  -     CBC & Differential  -     Comprehensive Metabolic Panel  -     Lipid Panel With / Chol / HDL Ratio  -     TSH  -     Sedimentation Rate  -     C-reactive Protein  -     [facility] Comprehensive Panel; Future    Seasonal allergic rhinitis, unspecified trigger    Raynaud's phenomenon without gangrene  -     Sedimentation Rate  -     C-reactive Protein  -     [facility] Comprehensive Panel; Future    Snoring  -     Ambulatory Referral to Sleep Medicine    Labs as noted  Recommend avoidance of processed foods  Exercise for 30 minutes 5 days a week as tolerated  Recommend annual eye doctor appointment, or as necessary  See your dentist twice a year.  Recommend that you brush teeth twice daily and floss minimum of once daily  Recommend regular seatbelt use  Do not text or use phone while driving  Referred for sleep study due to underlying snoring at night and chronic fatigue symptoms  Will check labs due to underlying Raynauds phenomenon to rule out autoimmune disease associated with this due to back pain  Recommend Zyrtec at night due to chronic congestion that is worse in the morning  Patient sees her gynecologist for Pap and breast exam  Patient will return to clinic next week for flu shot, as she currently has low-grade fever and I recommend she wait    Return to clinic in one year or when necessary        Please note that portions of this note were completed with a voice recognition program.  Efforts were made to edit dictation, but occasionally words are mistranscribed.

## 2019-01-25 ENCOUNTER — FLU SHOT (OUTPATIENT)
Dept: INTERNAL MEDICINE | Facility: CLINIC | Age: 39
End: 2019-01-25

## 2019-01-25 DIAGNOSIS — Z23 NEED FOR INFLUENZA VACCINATION: Primary | ICD-10-CM

## 2019-01-25 LAB
ALBUMIN SERPL-MCNC: 4.2 G/DL (ref 3.5–5)
ALBUMIN/GLOB SERPL: 1.5 G/DL (ref 1–2)
ALP SERPL-CCNC: 43 U/L (ref 38–126)
ALT SERPL-CCNC: 28 U/L (ref 13–69)
AST SERPL-CCNC: 19 U/L (ref 15–46)
BASOPHILS # BLD AUTO: 0.02 10*3/MM3 (ref 0–0.2)
BASOPHILS NFR BLD AUTO: 0.6 % (ref 0–2.5)
BILIRUB SERPL-MCNC: 0.5 MG/DL (ref 0.2–1.3)
BUN SERPL-MCNC: 13 MG/DL (ref 7–20)
BUN/CREAT SERPL: 16.3 (ref 7.1–23.5)
CALCIUM SERPL-MCNC: 9.3 MG/DL (ref 8.4–10.2)
CHLORIDE SERPL-SCNC: 109 MMOL/L (ref 98–107)
CHOLEST SERPL-MCNC: 135 MG/DL (ref 0–199)
CHOLEST/HDLC SERPL: 2.5 {RATIO}
CO2 SERPL-SCNC: 23 MMOL/L (ref 26–30)
CREAT SERPL-MCNC: 0.8 MG/DL (ref 0.6–1.3)
CRP SERPL-MCNC: <0.5 MG/DL (ref 0–1)
EOSINOPHIL # BLD AUTO: 0.06 10*3/MM3 (ref 0–0.7)
EOSINOPHIL NFR BLD AUTO: 1.7 % (ref 0–7)
ERYTHROCYTE [DISTWIDTH] IN BLOOD BY AUTOMATED COUNT: 12.4 % (ref 11.5–14.5)
ERYTHROCYTE [SEDIMENTATION RATE] IN BLOOD BY WESTERGREN METHOD: 0 MM/HR (ref 0–20)
GLOBULIN SER CALC-MCNC: 2.8 GM/DL
GLUCOSE SERPL-MCNC: 80 MG/DL (ref 74–98)
HCT VFR BLD AUTO: 39.1 % (ref 37–47)
HDLC SERPL-MCNC: 54 MG/DL (ref 40–60)
HGB BLD-MCNC: 13.1 G/DL (ref 12–16)
IMM GRANULOCYTES # BLD AUTO: 0 10*3/MM3 (ref 0–0.06)
IMM GRANULOCYTES NFR BLD AUTO: 0 % (ref 0–0.6)
LDLC SERPL CALC-MCNC: 72 MG/DL (ref 0–99)
LYMPHOCYTES # BLD AUTO: 1.27 10*3/MM3 (ref 0.6–3.4)
LYMPHOCYTES NFR BLD AUTO: 36.5 % (ref 10–50)
MCH RBC QN AUTO: 29.2 PG (ref 27–31)
MCHC RBC AUTO-ENTMCNC: 33.5 G/DL (ref 30–37)
MCV RBC AUTO: 87.3 FL (ref 81–99)
MONOCYTES # BLD AUTO: 0.3 10*3/MM3 (ref 0–0.9)
MONOCYTES NFR BLD AUTO: 8.6 % (ref 0–12)
NEUTROPHILS # BLD AUTO: 1.83 10*3/MM3 (ref 2–6.9)
NEUTROPHILS NFR BLD AUTO: 52.6 % (ref 37–80)
NRBC BLD AUTO-RTO: 0 /100 WBC (ref 0–0)
PLATELET # BLD AUTO: 178 10*3/MM3 (ref 130–400)
POTASSIUM SERPL-SCNC: 4.8 MMOL/L (ref 3.5–5.1)
PROT SERPL-MCNC: 7 G/DL (ref 6.3–8.2)
RBC # BLD AUTO: 4.48 10*6/MM3 (ref 4.2–5.4)
SODIUM SERPL-SCNC: 140 MMOL/L (ref 137–145)
TRIGL SERPL-MCNC: 46 MG/DL
TSH SERPL DL<=0.005 MIU/L-ACNC: 1.47 MIU/ML (ref 0.47–4.68)
VLDLC SERPL CALC-MCNC: 9.2 MG/DL
WBC # BLD AUTO: 3.48 10*3/MM3 (ref 4.8–10.8)

## 2019-01-25 PROCEDURE — 90471 IMMUNIZATION ADMIN: CPT | Performed by: INTERNAL MEDICINE

## 2019-01-25 PROCEDURE — 90674 CCIIV4 VAC NO PRSV 0.5 ML IM: CPT | Performed by: INTERNAL MEDICINE

## 2019-01-27 ENCOUNTER — RESULTS ENCOUNTER (OUTPATIENT)
Dept: INTERNAL MEDICINE | Facility: CLINIC | Age: 39
End: 2019-01-27

## 2019-01-27 DIAGNOSIS — Z00.00 PHYSICAL EXAM: ICD-10-CM

## 2019-01-27 DIAGNOSIS — I73.00 RAYNAUD'S PHENOMENON WITHOUT GANGRENE: ICD-10-CM

## 2019-01-28 LAB
CENTROMERE B AB SER-ACNC: <0.2 AI (ref 0–0.9)
CHROMATIN AB SERPL-ACNC: <0.2 AI (ref 0–0.9)
DSDNA AB SER-ACNC: <1 IU/ML (ref 0–9)
ENA JO1 AB SER-ACNC: <0.2 AI (ref 0–0.9)
ENA RNP AB SER-ACNC: 0.3 AI (ref 0–0.9)
ENA SCL70 AB SER-ACNC: <0.2 AI (ref 0–0.9)
ENA SM AB SER-ACNC: <0.2 AI (ref 0–0.9)
ENA SS-A AB SER-ACNC: <0.2 AI (ref 0–0.9)
ENA SS-B AB SER-ACNC: <0.2 AI (ref 0–0.9)
Lab: NORMAL

## 2020-07-13 PROBLEM — Z78.9 HISTORY OF RECENT TRAVEL: Status: ACTIVE | Noted: 2020-07-13

## 2020-07-13 PROBLEM — R51.9 HEADACHE: Status: ACTIVE | Noted: 2020-07-13

## 2020-07-13 PROBLEM — R09.89 RUNNY NOSE: Status: ACTIVE | Noted: 2020-07-13

## 2020-09-14 ENCOUNTER — OFFICE VISIT (OUTPATIENT)
Dept: FAMILY MEDICINE CLINIC | Facility: CLINIC | Age: 40
End: 2020-09-14

## 2020-09-14 VITALS
HEART RATE: 58 BPM | HEIGHT: 66 IN | BODY MASS INDEX: 24.59 KG/M2 | DIASTOLIC BLOOD PRESSURE: 64 MMHG | OXYGEN SATURATION: 99 % | TEMPERATURE: 97.7 F | WEIGHT: 153 LBS | SYSTOLIC BLOOD PRESSURE: 104 MMHG

## 2020-09-14 DIAGNOSIS — R13.19 ESOPHAGEAL DYSPHAGIA: ICD-10-CM

## 2020-09-14 DIAGNOSIS — K22.4 ESOPHAGEAL SPASM: ICD-10-CM

## 2020-09-14 DIAGNOSIS — R07.89 CHEST DISCOMFORT: Primary | ICD-10-CM

## 2020-09-14 DIAGNOSIS — K21.9 GERD WITHOUT ESOPHAGITIS: ICD-10-CM

## 2020-09-14 PROCEDURE — 93000 ELECTROCARDIOGRAM COMPLETE: CPT | Performed by: FAMILY MEDICINE

## 2020-09-14 PROCEDURE — 99214 OFFICE O/P EST MOD 30 MIN: CPT | Performed by: FAMILY MEDICINE

## 2020-09-14 RX ORDER — AMLODIPINE BESYLATE 2.5 MG/1
2.5 TABLET ORAL DAILY
Qty: 30 TABLET | Refills: 1 | Status: SHIPPED | OUTPATIENT
Start: 2020-09-14 | End: 2020-12-08

## 2020-09-14 RX ORDER — PANTOPRAZOLE SODIUM 40 MG/1
40 TABLET, DELAYED RELEASE ORAL DAILY
Qty: 30 TABLET | Refills: 1 | Status: SHIPPED | OUTPATIENT
Start: 2020-09-14 | End: 2020-10-29

## 2020-09-14 NOTE — PROGRESS NOTES
Established Patient        Chief Complaint:   Chief Complaint   Patient presents with   • Establish Care     former patient of Dr Vermeesch; c/o chest pain intermittently; snoring (patient had negative Sleep Apnea test); also c/o anxiety        Xuan Baron is a 40 y.o. female    History of Present Illness:   Here today with complaints of periodic sternal chest discomfort, described as palpitations.  Patient states that the symptoms last from seconds to minutes in duration.  They have not changed in severity, however they are more frequent.  She also admits to longstanding esophageal dysphagia, predominantly with solids.  She denies any aspiration or choking episodes of severe nature.  Patient has undergone an upper GI x-ray series with noted reflux, however this was several years ago.  She denies any bright red blood or black tarry stools.  Maintains good urine output.  Denies any hematuria.  No reports of any fever, chills or night sweats.  Maintains a balanced diet and active lifestyle.    Patient has been evaluated for obstructive sleep apnea, negative results.  She describes her  verifying that she has restless sleep as a result of choking sensation, she describes periodic burning sensation to her throat.  Subjective     The following portions of the patient's history were reviewed and updated as appropriate: allergies, current medications, past family history, past medical history, past social history, past surgical history and problem list.    No Known Allergies    Review of Systems  1. Constitutional: Negative for fever. Negative for chills, diaphoresis, fatigue and unexpected weight change.   2. HENT: No dysphagia; no changes to vision/hearing/smell/taste; no epistaxis  3. Eyes: Negative for redness and visual disturbance.   4. Respiratory: negative for shortness of breath. Negative for chest pain . Negative for cough and chest tightness.   5. Cardiovascular: Negative for chest pain and  "palpitations.   6. Gastrointestinal: As per above.  7. Endocrine: Negative for cold intolerance and heat intolerance.   8. Genitourinary: Negative for difficulty urinating, dysuria and frequency.   9. Musculoskeletal: Negative for arthralgias, back pain and myalgias.   10. Skin: Negative for color change, rash and wound.   11. Neurological: Negative for syncope, weakness and headaches.   12. Hematological: Negative for adenopathy. Does not bruise/bleed easily.   13. Psychiatric/Behavioral: Negative for confusion. The patient is not nervous/anxious.    Objective     Physical Exam   Vital Signs: /64   Pulse 58   Temp 97.7 °F (36.5 °C)   Ht 166.4 cm (65.5\")   Wt 69.4 kg (153 lb)   SpO2 99%   BMI 25.07 kg/m²     General Appearance: alert, oriented x 3, no acute distress.  Skin: warm and dry.   HEENT: Atraumatic.  pupils round and reactive to light and accommodation, oral mucosa pink and moist.  Nares patent without epistaxis.  External auditory canals are patent tympanic membranes intact.  Neck: supple, no JVD, trachea midline.  No thyromegaly  Lungs: CTA, unlabored breathing effort.  Heart: RRR, normal S1 and S2, no S3, no rub.  Abdomen: soft, non-tender, no palpable bladder, present bowel sounds to auscultation ×4.  No guarding or rigidity.  No CVA tenderness.  Extremities: no clubbing, cyanosis or edema.  Good range of motion actively and passively.  Symmetric muscle strength and development  Neuro: normal speech and mental status.  Cranial nerves II through XII intact.  No anosmia. DTR 2+; proprioception intact.  No focal motor/sensory deficits.      ECG 12 Lead    Date/Time: 9/14/2020 10:36 AM  Performed by: Jose Ng DO  Authorized by: Jose Ng DO   Comparison: not compared with previous ECG   Previous ECG: no previous ECG available  Rhythm: sinus rhythm  Rate: normal  BPM: 64  Conduction: conduction normal  ST Segments: ST segments normal  T Waves: T waves normal  Other: no other " findings    Clinical impression: normal ECG              Assessment and Plan      Assessment:   Xuan was seen today for establish care.    Diagnoses and all orders for this visit:    Chest discomfort  -     ECG 12 Lead    Esophageal spasm  -     Ambulatory Referral to Gastroenterology  -     pantoprazole (PROTONIX) 40 MG EC tablet; Take 1 tablet by mouth Daily.  -     amLODIPine (NORVASC) 2.5 MG tablet; Take 1 tablet by mouth Daily.    GERD without esophagitis  -     pantoprazole (PROTONIX) 40 MG EC tablet; Take 1 tablet by mouth Daily.    Esophageal dysphagia  -     Ambulatory Referral to Gastroenterology        Plan:  EKG is unremarkable.    I do suspect patient is suffering from esophageal spasms, likely of insidious reflux etiology.  I do suspect she also is suffering from esophageal dysphagia, I recommended referral to gastroenterology for needed upper endoscopy.    I have elected to utilize low-dose calcium channel blocker as well as once daily PPI therapy.  We can plan to titrate up the dose of her amlodipine.  I do suspect that patient's restless sleep as a result of insidious aspiration at night from her reflux.    I will see patient back in approximately 2 weeks for reevaluation and potential change to her treatment regimen.  Discussion Summary:    Discussed plan of care in detail with pt today; pt verb understanding and agrees.  Follow up:  Return in about 2 weeks (around 9/28/2020) for Recheck, Med Change/New Meds.     There are no Patient Instructions on file for this visit.    Jose Ng DO  09/15/20  17:08 EDT          Please note that portions of this note may have been completed with a voice recognition program. Efforts were made to edit the dictations, but occasionally words are mistranscribed.

## 2020-09-28 ENCOUNTER — OFFICE VISIT (OUTPATIENT)
Dept: FAMILY MEDICINE CLINIC | Facility: CLINIC | Age: 40
End: 2020-09-28

## 2020-09-28 VITALS
HEART RATE: 75 BPM | BODY MASS INDEX: 24.91 KG/M2 | SYSTOLIC BLOOD PRESSURE: 110 MMHG | HEIGHT: 66 IN | WEIGHT: 155 LBS | DIASTOLIC BLOOD PRESSURE: 70 MMHG | TEMPERATURE: 98.2 F | OXYGEN SATURATION: 97 %

## 2020-09-28 DIAGNOSIS — Z23 NEED FOR INFLUENZA VACCINATION: ICD-10-CM

## 2020-09-28 DIAGNOSIS — K21.9 GERD WITHOUT ESOPHAGITIS: Primary | ICD-10-CM

## 2020-09-28 DIAGNOSIS — F51.01 PRIMARY INSOMNIA: ICD-10-CM

## 2020-09-28 DIAGNOSIS — K22.4 ESOPHAGEAL SPASM: ICD-10-CM

## 2020-09-28 PROCEDURE — 90686 IIV4 VACC NO PRSV 0.5 ML IM: CPT | Performed by: FAMILY MEDICINE

## 2020-09-28 PROCEDURE — 90471 IMMUNIZATION ADMIN: CPT | Performed by: FAMILY MEDICINE

## 2020-09-28 PROCEDURE — 99213 OFFICE O/P EST LOW 20 MIN: CPT | Performed by: FAMILY MEDICINE

## 2020-09-28 RX ORDER — TRAZODONE HYDROCHLORIDE 50 MG/1
50 TABLET ORAL NIGHTLY
Qty: 30 TABLET | Refills: 3 | Status: SHIPPED | OUTPATIENT
Start: 2020-09-28 | End: 2020-12-08

## 2020-09-28 NOTE — PROGRESS NOTES
Established Patient        Chief Complaint:   Chief Complaint   Patient presents with   • Follow-up     patient doing well with Amlodipine however she is having issues staying asleep        Xuan Baron is a 40 y.o. female    History of Present Illness:   Here for scheduled follow-up visit concerning her esophageal spasms and GERD.  Patient states she has noticed profound improvement to her spasm symptoms, as well as reflux symptoms.  Denies any aspiration.  No reports of any bright red blood or black or tarry stools.  She does utilize amlodipine at night, she has noticed periodic difficulty with maintaining sleep since this change.  She also states that these changes in her sleep patterns were prevalent prior to starting amlodipine.  She is unsure if the symptoms have specifically worsened since starting amlodipine.  She denies any orthopnea or chest pain otherwise.  No reports of any fever, chills or night sweats.  She does at times have additional difficulties falling asleep, but predominantly her problem is maintaining sleep.    Subjective     The following portions of the patient's history were reviewed and updated as appropriate: allergies, current medications, past family history, past medical history, past social history, past surgical history and problem list.    No Known Allergies    Review of Systems  1. Constitutional: Negative for fever. Negative for chills, diaphoresis, fatigue and unexpected weight change.   2. HENT: No dysphagia; no changes to vision/hearing/smell/taste; no epistaxis  3. Eyes: Negative for redness and visual disturbance.   4. Respiratory: negative for shortness of breath. Negative for chest pain . Negative for cough and chest tightness.   5. Cardiovascular: Negative for chest pain and palpitations.   6. Gastrointestinal: Negative for abdominal distention, abdominal pain and blood in stool.   7. Endocrine: Negative for cold intolerance and heat intolerance.   8. Genitourinary:  "Negative for difficulty urinating, dysuria and frequency.   9. Musculoskeletal: Negative for arthralgias, back pain and myalgias.   10. Skin: Negative for color change, rash and wound.   11. Neurological: Negative for syncope, weakness and headaches.   12. Hematological: Negative for adenopathy. Does not bruise/bleed easily.   13. Psychiatric/Behavioral: Negative for confusion. The patient is not nervous/anxious.    Objective     Physical Exam   Vital Signs: /70   Pulse 75   Temp 98.2 °F (36.8 °C)   Ht 166.4 cm (65.5\")   Wt 70.3 kg (155 lb)   SpO2 97%   BMI 25.40 kg/m²     General Appearance: alert, oriented x 3, no acute distress.  Skin: warm and dry.   HEENT: Atraumatic.  pupils round and reactive to light and accommodation, oral mucosa pink and moist.  Nares patent without epistaxis.  External auditory canals are patent tympanic membranes intact.  Neck: supple, no JVD, trachea midline.  No thyromegaly  Lungs: CTA, unlabored breathing effort.  Heart: RRR, normal S1 and S2, no S3, no rub.  Abdomen: soft, non-tender, no palpable bladder, present bowel sounds to auscultation ×4.  No guarding or rigidity.  Extremities: no clubbing, cyanosis or edema.  Good range of motion actively and passively.  Symmetric muscle strength and development  Neuro: normal speech and mental status.  Cranial nerves II through XII intact.  No anosmia. DTR 2+; proprioception intact.  No focal motor/sensory deficits.    Assessment and Plan      Assessment:   Xuan was seen today for follow-up.    Diagnoses and all orders for this visit:    GERD without esophagitis    Primary insomnia  -     traZODone (DESYREL) 50 MG tablet; Take 1 tablet by mouth Every Night.    Esophageal spasm    Need for influenza vaccination  -     Fluarix Quad >6 Months ([id number])        Plan:  Change amlodipine to am dosing schedule.    Adding trazodone for sleep maintenance.    Flu vaccine today.    Continue PPI therapy, as well as dietary/lifestyle " modifications to aid in symptom management.    Discussion Summary:    Discussed plan of care in detail with pt today; pt verb understanding and agrees.  Follow up:  No follow-ups on file.     There are no Patient Instructions on file for this visit.    Jose Ng,   09/28/20  11:52 EDT          Please note that portions of this note may have been completed with a voice recognition program. Efforts were made to edit the dictations, but occasionally words are mistranscribed.

## 2020-09-30 ENCOUNTER — OFFICE VISIT (OUTPATIENT)
Dept: FAMILY MEDICINE CLINIC | Facility: CLINIC | Age: 40
End: 2020-09-30

## 2020-09-30 VITALS
DIASTOLIC BLOOD PRESSURE: 60 MMHG | WEIGHT: 152 LBS | SYSTOLIC BLOOD PRESSURE: 118 MMHG | HEART RATE: 65 BPM | TEMPERATURE: 98.9 F | BODY MASS INDEX: 24.43 KG/M2 | HEIGHT: 66 IN | OXYGEN SATURATION: 99 %

## 2020-09-30 DIAGNOSIS — R30.0 DYSURIA: Primary | ICD-10-CM

## 2020-09-30 LAB
BILIRUB BLD-MCNC: ABNORMAL MG/DL
CLARITY, POC: ABNORMAL
COLOR UR: ABNORMAL
GLUCOSE UR STRIP-MCNC: NEGATIVE MG/DL
KETONES UR QL: ABNORMAL
LEUKOCYTE EST, POC: ABNORMAL
NITRITE UR-MCNC: POSITIVE MG/ML
PH UR: 5.5 [PH] (ref 5–8)
PROT UR STRIP-MCNC: NEGATIVE MG/DL
RBC # UR STRIP: NEGATIVE /UL
SP GR UR: 1.01 (ref 1–1.03)
UROBILINOGEN UR QL: ABNORMAL

## 2020-09-30 PROCEDURE — 99213 OFFICE O/P EST LOW 20 MIN: CPT | Performed by: NURSE PRACTITIONER

## 2020-09-30 PROCEDURE — 81003 URINALYSIS AUTO W/O SCOPE: CPT | Performed by: NURSE PRACTITIONER

## 2020-09-30 RX ORDER — CEFUROXIME AXETIL 250 MG/1
250 TABLET ORAL 2 TIMES DAILY
Qty: 14 TABLET | Refills: 0 | Status: SHIPPED | OUTPATIENT
Start: 2020-09-30 | End: 2020-10-07

## 2020-10-04 LAB
BACTERIA UR CULT: ABNORMAL
BACTERIA UR CULT: ABNORMAL
OTHER ANTIBIOTIC SUSC ISLT: ABNORMAL

## 2020-10-05 NOTE — PROGRESS NOTES
Urine shows small colony count of E. coli.  Susceptible to cefuroxime.  Did she get any better with antibiotics?

## 2020-10-13 ENCOUNTER — OFFICE VISIT (OUTPATIENT)
Dept: GASTROENTEROLOGY | Facility: CLINIC | Age: 40
End: 2020-10-13

## 2020-10-13 VITALS
WEIGHT: 151 LBS | TEMPERATURE: 98.4 F | HEIGHT: 66 IN | RESPIRATION RATE: 16 BRPM | DIASTOLIC BLOOD PRESSURE: 70 MMHG | BODY MASS INDEX: 24.27 KG/M2 | HEART RATE: 73 BPM | SYSTOLIC BLOOD PRESSURE: 106 MMHG

## 2020-10-13 DIAGNOSIS — K59.04 CHRONIC IDIOPATHIC CONSTIPATION: Chronic | ICD-10-CM

## 2020-10-13 DIAGNOSIS — R13.19 ESOPHAGEAL DYSPHAGIA: Primary | Chronic | ICD-10-CM

## 2020-10-13 DIAGNOSIS — Z01.818 PREOP TESTING: Primary | ICD-10-CM

## 2020-10-13 DIAGNOSIS — K21.9 GASTROESOPHAGEAL REFLUX DISEASE, UNSPECIFIED WHETHER ESOPHAGITIS PRESENT: Chronic | ICD-10-CM

## 2020-10-13 PROCEDURE — 99214 OFFICE O/P EST MOD 30 MIN: CPT | Performed by: NURSE PRACTITIONER

## 2020-10-13 RX ORDER — SODIUM CHLORIDE 9 MG/ML
70 INJECTION, SOLUTION INTRAVENOUS CONTINUOUS PRN
Status: CANCELLED | OUTPATIENT
Start: 2020-11-06

## 2020-10-13 RX ORDER — POLYETHYLENE GLYCOL 1450
POWDER (GRAM) MISCELLANEOUS
Qty: 500 G | Refills: 3 | Status: SHIPPED | OUTPATIENT
Start: 2020-10-13 | End: 2021-10-26

## 2020-10-13 NOTE — PATIENT INSTRUCTIONS
1. Antireflux measures: Avoid fried, fatty foods, alcohol, chocolate, coffee, tea,  soft drinks, peppermint and spearmint, spicy foods, tomatoes and tomato based foods, onion based foods, and smoking. Other antireflux measures include weight reduction if overweight, avoiding tight clothing around the abdomen, elevating the head of the bed 6 inches with blocks under the head board, and don't drink or eat before going to bed and avoid lying down immediately after meals.  2. Pantoprazole 40 mg 1 by mouth in the am 30 minutes before breakfast.  3. Dietary instructions.  The patient should eat relatively soft diet, and should eat in upright position and chew well.  The patient should drink water after 2-3 bites and take medications with liberal amounts of water. Furthermore after eating and taking medications, the patient should remain in upright position for 5-10 minutes.  4. High fiber diet with liberal water intake.   5. Miralax 17 grams in 8 ounces of liquid daily.  6. The patient may take stool softeners, 1-2 per day as needed.  7. Upper endoscopy-EGD: Description of the procedure, risks, benefits, alternatives and options, including nonoperative options, were discussed with the patient in detail. The patient understands and wishes to proceed.  8. COVID-19 testing prior to procedure. The patient will need to self-quarantine after testing until the procedure. Instructions given to patient.

## 2020-10-13 NOTE — PROGRESS NOTES
"     New Patient Consult      Date: 10/13/2020   Patient Name: Xuan Baron  MRN: [id number]  : 1980     Primary Care Provider: Jose Ng DO    Chief Complaint   Patient presents with   • Difficulty Swallowing     History of Present Illness: Xuan Baron is a 40 y.o. female who is here today to establish care with Gastroenterology for difficulty swallowing.    The patient has a history of difficulty swallowing for many years that has gradually worsened. She had upper GI in 2017 with reflux. About a month ago, she started having worsening of difficulty swallowing along with episodes of \"esophagus spasms\". She has difficulty swallowing on a daily basis swallowing meats and breads, no difficulty swallowing liquids. She randomly has the episodes of \"esophagus spasms\" that can even occur in the middle of the night. She has a history of reflux and was recently started on PPI. The patient denies abdominal pain, nausea or vomiting. There is no history of weight loss.    The patient has a history of constipation for many years. She does not take anything for constipation on a regular basis. She has a bowel movement daily, but it is usually \"very small pellets\". No rectal bleeding    The patient has not had a colonoscopy or EGD in the past. There is no family history of GI malignancy.    Subjective      Past Medical History:   Diagnosis Date   • Anemia    • Back pain    • Kidney infection    • Ovarian cyst    • Tattoo      Past Surgical History:   Procedure Laterality Date   • LASER ABLATION OF THE CERVIX     • SUBTOTAL HYSTERECTOMY       Family History   Problem Relation Age of Onset   • Heart attack Mother    • Stroke Mother    • Thyroid disease Mother    • Diabetes Father    • Heart attack Father    • Hypertension Father    • Obesity Father    • Migraines Son    • Colon cancer Neg Hx      Social History     Socioeconomic History   • Marital status:      Spouse name: Not on file   • Number of children: " Not on file   • Years of education: Not on file   • Highest education level: Not on file   Occupational History     Employer: [employer]   Tobacco Use   • Smoking status: Never Smoker   • Smokeless tobacco: Never Used   Substance and Sexual Activity   • Alcohol use: No   • Drug use: No   • Sexual activity: Defer       Current Outpatient Medications:   •  amLODIPine (NORVASC) 2.5 MG tablet, Take 1 tablet by mouth Daily., Disp: 30 tablet, Rfl: 1  •  pantoprazole (PROTONIX) 40 MG EC tablet, Take 1 tablet by mouth Daily., Disp: 30 tablet, Rfl: 1  •  Polyethylene Glycol powder, Take 1 cap full (17 grams) in 8 oz of noncarbonated beverage and drink once daily, Disp: 500 g, Rfl: 3  •  traZODone (DESYREL) 50 MG tablet, Take 1 tablet by mouth Every Night., Disp: 30 tablet, Rfl: 3    No Known Allergies     Review of Systems   Constitutional: Negative for appetite change and unexpected weight loss.   HENT: Positive for trouble swallowing.    Eyes: Negative for blurred vision.   Respiratory: Negative for choking and chest tightness.    Cardiovascular: Negative for leg swelling.   Gastrointestinal: Positive for GERD. Negative for abdominal distention, abdominal pain, anal bleeding, blood in stool, constipation, diarrhea, nausea, rectal pain, vomiting and indigestion.   Endocrine: Negative for polyphagia.   Genitourinary: Negative for hematuria.   Musculoskeletal: Negative for arthralgias and myalgias.   Skin: Negative for rash.   Allergic/Immunologic: Negative for food allergies.   Neurological: Negative for dizziness, syncope and confusion.   Hematological: Does not bruise/bleed easily.   Psychiatric/Behavioral: Negative for depressed mood.      The following portions of the patient's history were reviewed and updated as appropriate: allergies, current medications, past family history, past medical history, past social history, past surgical history and problem list.    Objective     Physical  Exam  Vitals signs and nursing note reviewed.   Constitutional:       General: She is awake. She is not in acute distress.     Appearance: Normal appearance. She is well-developed. She is not diaphoretic.   HENT:      Head: Normocephalic and atraumatic.      Right Ear: Hearing and external ear normal.      Left Ear: Hearing and external ear normal.      Nose: Nose normal.      Mouth/Throat:      Lips: Pink.      Mouth: Mucous membranes are not pale, not dry and not cyanotic. No oral lesions.      Pharynx: No oropharyngeal exudate.   Eyes:      General: Lids are normal.         Right eye: No discharge.         Left eye: No discharge.      Conjunctiva/sclera: Conjunctivae normal.   Neck:      Musculoskeletal: Neck supple. No edema.      Thyroid: No thyroid mass or thyromegaly.      Vascular: No JVD.      Trachea: Trachea normal.   Cardiovascular:      Rate and Rhythm: Normal rate and regular rhythm.      Heart sounds: Normal heart sounds and S2 normal. No murmur. No friction rub. No gallop. No S3 sounds.    Pulmonary:      Effort: Pulmonary effort is normal. No respiratory distress.      Breath sounds: Normal breath sounds.   Chest:      Chest wall: No tenderness.   Abdominal:      General: Bowel sounds are normal. There is no distension.      Palpations: Abdomen is not rigid. There is no hepatomegaly, splenomegaly or mass.      Tenderness: There is no abdominal tenderness. There is no guarding or rebound.      Hernia: No hernia is present.   Musculoskeletal: Normal range of motion.   Lymphadenopathy:      Cervical: No cervical adenopathy.      Upper Body:      Left upper body: No supraclavicular adenopathy.   Skin:     General: Skin is warm and dry.      Coloration: Skin is not pale.      Findings: No rash.      Nails: There is no clubbing.     Neurological:      Mental Status: She is alert and oriented to person, place, and time.      Cranial Nerves: No cranial nerve deficit.      Sensory: No sensory deficit.  "  Psychiatric:         Mood and Affect: Mood normal.         Speech: Speech normal.         Behavior: Behavior is cooperative.       Vital Signs:   Vitals:    10/13/20 0904   BP: 106/70   Pulse: 73   Resp: 16   Temp: 98.4 °F (36.9 °C)   Weight: 68.5 kg (151 lb)   Height: 166.4 cm (65.5\")     Results Review:   I have reviewed the patient's new clinical and imaging results.    Orders Only on 10/01/2020   Component Date Value Ref Range Status   • Urine Culture 10/01/2020 Final report*  Final   • Result 1 10/01/2020 Escherichia coli*  Final    Comment: 10,000-25,000 colony forming units per mL  Cefazolin <=4 ug/mL  Cefazolin with an KALIE <=16 predicts susceptibility to the oral agents  cefaclor, cefdinir, cefpodoxime, cefprozil, cefuroxime, cephalexin,  and loracarbef when used for therapy of uncomplicated urinary tract  infections due to E. coli, Klebsiella pneumoniae, and Proteus  mirabilis.     • Susceptibility Testing 10/01/2020 Comment   Final    Comment:       ** S = Susceptible; I = Intermediate; R = Resistant **                     P = Positive; N = Negative              MICS are expressed in micrograms per mL     Antibiotic                 RSLT#1    RSLT#2    RSLT#3    RSLT#4  Amoxicillin/Clavulanic Acid    S  Ampicillin                     S  Cefepime                       S  Ceftriaxone                    S  Cefuroxime                     S  Ciprofloxacin                  S  Ertapenem                      S  Gentamicin                     S  Imipenem                       S  Levofloxacin                   S  Meropenem                      S  Nitrofurantoin                 S  Piperacillin/Tazobactam        S  Tetracycline                   S  Tobramycin                     S  Trimethoprim/Sulfa             S     Office Visit on 09/30/2020   Component Date Value Ref Range Status   • Color 09/30/2020 Orange* Yellow, Straw, Dark Yellow, Anayeli Final   • Clarity, UA 09/30/2020 Cloudy* Clear Final   • Specific " Gravity  09/30/2020 1.015  1.005 - 1.030 Final   • pH, Urine 09/30/2020 5.5  5.0 - 8.0 Final   • Leukocytes 09/30/2020 500 Sosa/ul* Negative Final   • Nitrite, UA 09/30/2020 Positive* Negative Final   • Protein, POC 09/30/2020 Negative  Negative mg/dL Final   • Glucose, UA 09/30/2020 Negative  Negative, 1000 mg/dL (3+) mg/dL Final   • Ketones, UA 09/30/2020 Trace* Negative Final   • Urobilinogen, UA 09/30/2020 4 E.U./dL * Normal Final   • Bilirubin 09/30/2020 Moderate (2+)* Negative Final   • Blood, UA 09/30/2020 Negative  Negative Final   Admission on 07/13/2020, Discharged on 07/13/2020   Component Date Value Ref Range Status   • SARS-CoV-2, ANA 07/13/2020 Not Detected  Not Detected Final    This test was developed and its performance characteristics determined  by [facility]. This test has not been FDA cleared or  approved. This test has been authorized by FDA under an Emergency Use  Authorization (EUA). This test is only authorized for the duration of  time the declaration that circumstances exist justifying the  authorization of the emergency use of in vitro diagnostic tests for  detection of SARS-CoV-2 virus and/or diagnosis of COVID-19 infection  under section 564(b)(1) of the Act, 21 U.S.C. 360bbb-3(b)(1), unless  the authorization is terminated or revoked sooner.  When diagnostic testing is negative, the possibility of a false  negative result should be considered in the context of a patient's  recent exposures and the presence of clinical signs and symptoms  consistent with COVID-19. An individual without symptoms of COVID-19  and who is not shedding SARS-CoV-2 virus would expect to have a  negative (not detected) result in this assay.   • COVID [facility] PRIORITY 07/13/2020 Comment   Final    Received      No radiology results for the last 90 days.     Assessment / Plan      1. Esophageal dysphagia  The patient has a history of difficulty swallowing for over 4 years that has gradually worsened.   "About a month ago she began having \"esophagus spasms\" that can occur in the middle of the night.  The patient has difficulty swallowing meats and breads on a daily basis.  There is no difficulty swallowing liquids.  There is no history of weight loss. The patient had fluoroscopic upper GI in 2017 with mild gastroesophageal reflux into the distal esophagus noted. The patient was recently started on Pantoprazole 40 mg, but she has not noticed any change in her symptoms.  EGD to rule out esophageal stricture.    - Case Request; Standing  - Implement Anesthesia Orders Day of Procedure; Standing  - Obtain Informed Consent; Standing  - Oxygen Therapy- Nasal Cannula; 2 LPM; Titrate for SPO2: equal to or greater than, 90%; Standing  - sodium chloride 0.9 % infusion  - Case Request    2. Gastroesophageal reflux disease, unspecified whether esophagitis present  Longstanding history of reflux.  The patient was recently started on pantoprazole 40 mg, but she has not noticed any difference as she just started taking it.  Discussed anti-reflux measures.  Continue PPI for now.    3. Chronic idiopathic constipation  The patient has a history of constipation for many years.  She is not taking anything on a regular basis for constipation.  There is no history of rectal bleeding.  The patient denies abdominal pain. CBC, CMP, TSH in the past unremarkable. Previous x-ray with increased stool scattered throughout the colon.  High fiber diet with liberal water intake.   Miralax 17 grams daily.  The patient may take stool softeners, 1-2 per day, if needed.    - Polyethylene Glycol powder; Take 1 cap full (17 grams) in 8 oz of noncarbonated beverage and drink once daily  Dispense: 500 g; Refill: 3    Patient Instructions   1. Antireflux measures: Avoid fried, fatty foods, alcohol, chocolate, coffee, tea,  soft drinks, peppermint and spearmint, spicy foods, tomatoes and tomato based foods, onion based foods, and smoking. Other antireflux " measures include weight reduction if overweight, avoiding tight clothing around the abdomen, elevating the head of the bed 6 inches with blocks under the head board, and don't drink or eat before going to bed and avoid lying down immediately after meals.  2. Pantoprazole 40 mg 1 by mouth in the am 30 minutes before breakfast.  3. Dietary instructions.  The patient should eat relatively soft diet, and should eat in upright position and chew well.  The patient should drink water after 2-3 bites and take medications with liberal amounts of water. Furthermore after eating and taking medications, the patient should remain in upright position for 5-10 minutes.  4. High fiber diet with liberal water intake.   5. Miralax 17 grams in 8 ounces of liquid daily.  6. The patient may take stool softeners, 1-2 per day as needed.  7. Upper endoscopy-EGD: Description of the procedure, risks, benefits, alternatives and options, including nonoperative options, were discussed with the patient in detail. The patient understands and wishes to proceed.  8. COVID-19 testing prior to procedure. The patient will need to self-quarantine after testing until the procedure. Instructions given to patient.     Myrna Jordan, APRN  10/13/2020    Please note that portions of this note may have been completed with a voice recognition program. Efforts were made to edit the dictations, but occasionally words are mistranscribed.

## 2020-10-29 DIAGNOSIS — K22.4 ESOPHAGEAL SPASM: ICD-10-CM

## 2020-10-29 DIAGNOSIS — K21.9 GERD WITHOUT ESOPHAGITIS: ICD-10-CM

## 2020-10-29 RX ORDER — PANTOPRAZOLE SODIUM 40 MG/1
40 TABLET, DELAYED RELEASE ORAL DAILY
Qty: 90 TABLET | Refills: 1 | Status: SHIPPED | OUTPATIENT
Start: 2020-10-29 | End: 2020-12-16 | Stop reason: SDUPTHER

## 2020-11-03 ENCOUNTER — LAB (OUTPATIENT)
Dept: LAB | Facility: HOSPITAL | Age: 40
End: 2020-11-03

## 2020-11-03 DIAGNOSIS — Z01.818 PREOP TESTING: ICD-10-CM

## 2020-11-03 PROCEDURE — C9803 HOPD COVID-19 SPEC COLLECT: HCPCS

## 2020-11-03 PROCEDURE — U0004 COV-19 TEST NON-CDC HGH THRU: HCPCS | Performed by: NURSE PRACTITIONER

## 2020-11-04 LAB — SARS-COV-2 RNA RESP QL NAA+PROBE: NOT DETECTED

## 2020-11-06 ENCOUNTER — ANESTHESIA EVENT (OUTPATIENT)
Dept: GASTROENTEROLOGY | Facility: HOSPITAL | Age: 40
End: 2020-11-06

## 2020-11-06 ENCOUNTER — HOSPITAL ENCOUNTER (OUTPATIENT)
Facility: HOSPITAL | Age: 40
Setting detail: HOSPITAL OUTPATIENT SURGERY
Discharge: HOME | End: 2020-11-06
Attending: INTERNAL MEDICINE | Admitting: INTERNAL MEDICINE

## 2020-11-06 ENCOUNTER — ANESTHESIA (OUTPATIENT)
Dept: GASTROENTEROLOGY | Facility: HOSPITAL | Age: 40
End: 2020-11-06

## 2020-11-06 VITALS
BODY MASS INDEX: 24.11 KG/M2 | DIASTOLIC BLOOD PRESSURE: 68 MMHG | OXYGEN SATURATION: 100 % | HEART RATE: 70 BPM | SYSTOLIC BLOOD PRESSURE: 110 MMHG | HEIGHT: 66 IN | TEMPERATURE: 97.7 F | RESPIRATION RATE: 16 BRPM | WEIGHT: 150 LBS

## 2020-11-06 DIAGNOSIS — R13.19 ESOPHAGEAL DYSPHAGIA: ICD-10-CM

## 2020-11-06 PROCEDURE — 43239 EGD BIOPSY SINGLE/MULTIPLE: CPT | Performed by: INTERNAL MEDICINE

## 2020-11-06 PROCEDURE — 25010000002 PROPOFOL 10 MG/ML EMULSION: Performed by: NURSE ANESTHETIST, CERTIFIED REGISTERED

## 2020-11-06 PROCEDURE — 43248 EGD GUIDE WIRE INSERTION: CPT | Performed by: INTERNAL MEDICINE

## 2020-11-06 PROCEDURE — 43255 EGD CONTROL BLEEDING ANY: CPT | Performed by: INTERNAL MEDICINE

## 2020-11-06 PROCEDURE — 25010000002 ONDANSETRON PER 1 MG: Performed by: NURSE ANESTHETIST, CERTIFIED REGISTERED

## 2020-11-06 DEVICE — DEV CLIP GI QUICKCLIPPRO FIX ROT 2300MM: Type: IMPLANTABLE DEVICE | Site: STOMACH | Status: FUNCTIONAL

## 2020-11-06 RX ORDER — LIDOCAINE HYDROCHLORIDE 20 MG/ML
INJECTION, SOLUTION INTRAVENOUS AS NEEDED
Status: DISCONTINUED | OUTPATIENT
Start: 2020-11-06 | End: 2020-11-06 | Stop reason: SURG

## 2020-11-06 RX ORDER — KETAMINE HYDROCHLORIDE 50 MG/ML
INJECTION, SOLUTION, CONCENTRATE INTRAMUSCULAR; INTRAVENOUS AS NEEDED
Status: DISCONTINUED | OUTPATIENT
Start: 2020-11-06 | End: 2020-11-06 | Stop reason: SURG

## 2020-11-06 RX ORDER — ONDANSETRON 2 MG/ML
INJECTION INTRAMUSCULAR; INTRAVENOUS AS NEEDED
Status: DISCONTINUED | OUTPATIENT
Start: 2020-11-06 | End: 2020-11-06 | Stop reason: SURG

## 2020-11-06 RX ORDER — SODIUM CHLORIDE 9 MG/ML
70 INJECTION, SOLUTION INTRAVENOUS CONTINUOUS PRN
Status: DISCONTINUED | OUTPATIENT
Start: 2020-11-06 | End: 2020-11-06 | Stop reason: HOSPADM

## 2020-11-06 RX ORDER — MAGNESIUM HYDROXIDE 1200 MG/15ML
LIQUID ORAL AS NEEDED
Status: DISCONTINUED | OUTPATIENT
Start: 2020-11-06 | End: 2020-11-06 | Stop reason: HOSPADM

## 2020-11-06 RX ORDER — PROPOFOL 10 MG/ML
VIAL (ML) INTRAVENOUS AS NEEDED
Status: DISCONTINUED | OUTPATIENT
Start: 2020-11-06 | End: 2020-11-06 | Stop reason: SURG

## 2020-11-06 RX ADMIN — SODIUM CHLORIDE 70 ML/HR: 9 INJECTION, SOLUTION INTRAVENOUS at 07:55

## 2020-11-06 RX ADMIN — PROPOFOL 50 MG: 10 INJECTION, EMULSION INTRAVENOUS at 09:05

## 2020-11-06 RX ADMIN — KETAMINE HYDROCHLORIDE 15 MG: 50 INJECTION, SOLUTION INTRAMUSCULAR; INTRAVENOUS at 08:59

## 2020-11-06 RX ADMIN — LIDOCAINE HYDROCHLORIDE 40 MG: 20 INJECTION, SOLUTION INTRAVENOUS at 08:57

## 2020-11-06 RX ADMIN — PROPOFOL 100 MG: 10 INJECTION, EMULSION INTRAVENOUS at 08:57

## 2020-11-06 RX ADMIN — PROPOFOL 50 MG: 10 INJECTION, EMULSION INTRAVENOUS at 09:07

## 2020-11-06 RX ADMIN — PROPOFOL 50 MG: 10 INJECTION, EMULSION INTRAVENOUS at 09:01

## 2020-11-06 RX ADMIN — PROPOFOL 50 MG: 10 INJECTION, EMULSION INTRAVENOUS at 09:03

## 2020-11-06 RX ADMIN — ONDANSETRON 4 MG: 2 INJECTION INTRAMUSCULAR; INTRAVENOUS at 08:46

## 2020-11-06 RX ADMIN — PROPOFOL 50 MG: 10 INJECTION, EMULSION INTRAVENOUS at 08:59

## 2020-11-06 NOTE — ANESTHESIA POSTPROCEDURE EVALUATION
Patient: Xuan Baron    Procedure Summary     Date: 11/06/20 Room / Location: [facility] ENDOSCOPY 1 / [facility] ENDOSCOPY    Anesthesia Start: 0842 Anesthesia Stop:     Procedure: ESOPHAGOGASTRODUODENOSCOPY WITH ARGON COAGULATION, BIOPSIES, AND DILATATION (N/A Esophagus) Diagnosis:       Esophageal dysphagia      (Esophageal dysphagia [R13.10])    Surgeon: Mer Morrow MD Provider: Don Rob CRNA    Anesthesia Type: MAC ASA Status: 2          Anesthesia Type: MAC    Vitals  HR 98  Sat 97  Resp 11  /64  Temp 97.6        Post Anesthesia Care and Evaluation    Patient location during evaluation: bedside  Patient participation: complete - patient participated  Level of consciousness: awake and alert and sleepy but conscious  Pain score: 0  Pain management: adequate  Airway patency: patent  Anesthetic complications: No anesthetic complications  PONV Status: none  Cardiovascular status: acceptable  Respiratory status: acceptable  Hydration status: acceptable

## 2020-11-06 NOTE — H&P
"    [facility]  HISTORY AND PHYSICAL    Patient Name: Xuan Baron  : 1980  MRN: [id number]    Chief Complaint:   For EGD dilatation    History Of Presenting Illness:    Esophageal dilatation  GERD    Past Medical History:   Diagnosis Date   • Anemia    • Asthma     Patient reported \"a touch years ago\"   • Back pain    • Body piercing     ears   • Dysphagia     Patient reported noted more with breads and cakes but noted intermittently with various textures   • Epigastric pain    • GERD (gastroesophageal reflux disease)    • Hypotension    • Kidney infection     Reported recurrent UTI   • Ovarian cyst    • PONV (postoperative nausea and vomiting)    • Seasonal allergies    • Sleep apnea     Patient reported had testing done that was \"borderline\" and that no CPAP was warranted   • Tattoo    • Wears contact lenses     Patient advised that contacts will have to be removed DOS   • Wears glasses        Past Surgical History:   Procedure Laterality Date   • ENDOSCOPY     • LASER ABLATION OF THE CERVIX     • SUBTOTAL HYSTERECTOMY     • WISDOM TOOTH EXTRACTION         Social History     Socioeconomic History   • Marital status:      Spouse name: Not on file   • Number of children: Not on file   • Years of education: Not on file   • Highest education level: Not on file   Occupational History     Employer: [employer]   Tobacco Use   • Smoking status: Never Smoker   • Smokeless tobacco: Never Used   Substance and Sexual Activity   • Alcohol use: No   • Drug use: No   • Sexual activity: Defer       Family History   Problem Relation Age of Onset   • Heart attack Mother    • Stroke Mother    • Thyroid disease Mother    • Diabetes Father    • Heart attack Father    • Hypertension Father    • Obesity Father    • Migraines Son    • Colon cancer Neg Hx        Prior to Admission Medications:  Medications Prior to Admission   Medication Sig Dispense Refill Last Dose   • amLODIPine " (NORVASC) 2.5 MG tablet Take 1 tablet by mouth Daily. 30 tablet 1 11/1/2020   • pantoprazole (PROTONIX) 40 MG EC tablet TAKE 1 TABLET BY MOUTH DAILY 90 tablet 1 11/5/2020 at 0800   • Polyethylene Glycol powder Take 1 cap full (17 grams) in 8 oz of noncarbonated beverage and drink once daily (Patient taking differently: Take  by mouth Daily. Take 1 cap full (17 grams) in 8 oz of noncarbonated beverage and drink once daily) 500 g 3 11/4/2020   • traZODone (DESYREL) 50 MG tablet Take 1 tablet by mouth Every Night. (Patient taking differently: Take 50 mg by mouth At Night As Needed for Sleep.) 30 tablet 3 More than a month at Unknown time       Allergies:  No Known Allergies     Vitals: Temp:  [97.7 °F (36.5 °C)] 97.7 °F (36.5 °C)  Heart Rate:  [73] 73  Resp:  [16] 16  BP: (107)/(69) 107/69    Review Of Systems:  Constitutional:  Negative for chills, fever, and unexpected weight change.  Respiratory:  Negative for cough, chest tightness, shortness of breath, and wheezing.  Cardiovascular:  Negative for chest pain, palpitations, and leg swelling.  Gastrointestinal:  Negative for abdominal distention, abdominal pain, Nausea, vomiting.  Neurological:  Negative for Weakness, numbness, and headaches.     Physical Exam:    General Appearance:  Alert, cooperative, in no acute distress.   Lungs:   Clear to auscultation, respirations regular, even and                 unlabored.   Heart:  Regular rhythm and normal rate.   Abdomen:   Normal bowel sounds, no masses, no organomegaly. Soft, non-tender, non-distended   Neurologic: Alert and oriented x 3. Moves all four limbs equally       Plan: ESOPHAGOGASTRODUODENOSCOPY (N/A)     Mer Morrow MD  11/6/2020

## 2020-11-06 NOTE — ANESTHESIA PREPROCEDURE EVALUATION
Anesthesia Evaluation     Patient summary reviewed and Nursing notes reviewed   history of anesthetic complications: PONV  NPO Solid Status: > 8 hours  NPO Liquid Status: > 8 hours           Airway   Mallampati: II  TM distance: >3 FB  Neck ROM: full  No difficulty expected  Dental - normal exam     Pulmonary - normal exam   (+) asthma (no issues currently ),sleep apnea (borderline, no CPAP ordered ),   (-) not a smoker  Cardiovascular - normal exam        Neuro/Psych  (+) headaches,     GI/Hepatic/Renal/Endo    (+)  GERD poorly controlled,      ROS Comment: Dysphagia     Musculoskeletal     (+) back pain,   Abdominal    Substance History      OB/GYN          Other   blood dyscrasia anemia,     ROS/Med Hx Other: Raynaud's                   Anesthesia Plan    ASA 2     MAC   (Risks and benefits discussed including risk of aspiration, recall and dental damage. All patient questions answered. Will continue with POC.)    Anesthetic plan, all risks, benefits, and alternatives have been provided, discussed and informed consent has been obtained with: patient.    Plan discussed with CRNA.

## 2020-11-06 NOTE — DISCHARGE INSTRUCTIONS
- Discharge patient to home (ambulatory).   - Full liquid diet today.   - Continue present medications.   - No NSAID or ASA for 3 days  - PPI daily  - Await pathology results.   - Return to my office in 4 weeks.     No pushing, pulling, tugging,  heavy lifting, or strenuous activity.  No major decision making, driving, or drinking alcoholic beverages for 24 hours. ( due to the medications you have  received)  Always use good hand hygiene/washing techniques.  NO driving while taking pain medications.    * if you have an incision:  Check your incision area every day for signs of infection.   Check for:  * more redness, swelling, or pain  *more fluid or blood  *warmth  *pus or bad smell    To assist you in voiding:  Drink plenty of fluids  Listen to running water while attempting to void.    If you are unable to urinate and you have an uncomfortable urge to void or it has been   6 hours since you were discharged, return to the Emergency Room

## 2020-11-11 DIAGNOSIS — K21.9 GERD WITHOUT ESOPHAGITIS: ICD-10-CM

## 2020-11-11 DIAGNOSIS — K22.4 ESOPHAGEAL SPASM: ICD-10-CM

## 2020-11-11 DIAGNOSIS — A04.8 H. PYLORI INFECTION: Primary | ICD-10-CM

## 2020-11-11 LAB
LAB AP CASE REPORT: NORMAL
PATH REPORT.FINAL DX SPEC: NORMAL

## 2020-11-11 RX ORDER — AMOXICILLIN 500 MG/1
1000 CAPSULE ORAL 2 TIMES DAILY
Qty: 40 CAPSULE | Refills: 0 | Status: SHIPPED | OUTPATIENT
Start: 2020-11-11 | End: 2020-11-21

## 2020-11-11 RX ORDER — METRONIDAZOLE 500 MG/1
500 TABLET ORAL 2 TIMES DAILY
Qty: 20 TABLET | Refills: 0 | Status: SHIPPED | OUTPATIENT
Start: 2020-11-11 | End: 2020-11-21

## 2020-12-08 ENCOUNTER — OFFICE VISIT (OUTPATIENT)
Dept: FAMILY MEDICINE CLINIC | Facility: CLINIC | Age: 40
End: 2020-12-08

## 2020-12-08 VITALS
SYSTOLIC BLOOD PRESSURE: 110 MMHG | HEIGHT: 66 IN | DIASTOLIC BLOOD PRESSURE: 74 MMHG | WEIGHT: 156 LBS | OXYGEN SATURATION: 100 % | HEART RATE: 84 BPM | BODY MASS INDEX: 25.07 KG/M2 | TEMPERATURE: 97.5 F

## 2020-12-08 DIAGNOSIS — R13.19 ESOPHAGEAL DYSPHAGIA: Primary | ICD-10-CM

## 2020-12-08 DIAGNOSIS — K22.4 ESOPHAGEAL SPASM: ICD-10-CM

## 2020-12-08 DIAGNOSIS — K21.9 GASTROESOPHAGEAL REFLUX DISEASE WITHOUT ESOPHAGITIS: ICD-10-CM

## 2020-12-08 DIAGNOSIS — I73.00 RAYNAUD'S PHENOMENON WITHOUT GANGRENE: ICD-10-CM

## 2020-12-08 DIAGNOSIS — F51.01 PRIMARY INSOMNIA: ICD-10-CM

## 2020-12-08 PROCEDURE — 99213 OFFICE O/P EST LOW 20 MIN: CPT | Performed by: FAMILY MEDICINE

## 2020-12-08 RX ORDER — AMLODIPINE BESYLATE 2.5 MG/1
2.5 TABLET ORAL DAILY
Qty: 90 TABLET | Refills: 1
Start: 2020-12-08 | End: 2021-12-10

## 2020-12-08 RX ORDER — TRAZODONE HYDROCHLORIDE 50 MG/1
50 TABLET ORAL NIGHTLY
Qty: 90 TABLET | Refills: 1
Start: 2020-12-08 | End: 2021-04-06

## 2020-12-16 ENCOUNTER — OFFICE VISIT (OUTPATIENT)
Dept: GASTROENTEROLOGY | Facility: CLINIC | Age: 40
End: 2020-12-16

## 2020-12-16 VITALS
BODY MASS INDEX: 25.55 KG/M2 | WEIGHT: 159 LBS | DIASTOLIC BLOOD PRESSURE: 59 MMHG | HEIGHT: 66 IN | RESPIRATION RATE: 16 BRPM | TEMPERATURE: 98 F | HEART RATE: 77 BPM | SYSTOLIC BLOOD PRESSURE: 109 MMHG

## 2020-12-16 DIAGNOSIS — R13.19 ESOPHAGEAL DYSPHAGIA: Chronic | ICD-10-CM

## 2020-12-16 DIAGNOSIS — K21.00 GASTROESOPHAGEAL REFLUX DISEASE WITH ESOPHAGITIS WITHOUT HEMORRHAGE: Primary | Chronic | ICD-10-CM

## 2020-12-16 DIAGNOSIS — K59.04 CHRONIC IDIOPATHIC CONSTIPATION: Chronic | ICD-10-CM

## 2020-12-16 DIAGNOSIS — A04.8 HELICOBACTER PYLORI INFECTION: Chronic | ICD-10-CM

## 2020-12-16 PROCEDURE — 99214 OFFICE O/P EST MOD 30 MIN: CPT | Performed by: NURSE PRACTITIONER

## 2020-12-16 RX ORDER — PANTOPRAZOLE SODIUM 40 MG/1
40 TABLET, DELAYED RELEASE ORAL DAILY
Qty: 90 TABLET | Refills: 1 | Status: SHIPPED | OUTPATIENT
Start: 2020-12-16 | End: 2021-10-26

## 2020-12-16 NOTE — PATIENT INSTRUCTIONS
1. Antireflux measures: Avoid fried, fatty foods, alcohol, chocolate, coffee, tea,  soft drinks, peppermint and spearmint, spicy foods, tomatoes and tomato based foods, onion based foods, and smoking. Other antireflux measures include weight reduction if overweight, avoiding tight clothing around the abdomen, elevating the head of the bed 6 inches with blocks under the head board, and don't drink or eat before going to bed and avoid lying down immediately after meals.  2. Pantoprazole 40 mg 1 by mouth in the am 30 minutes before breakfast.  3. Dietary instructions.  The patient should eat relatively soft diet, and should eat in upright position and chew well.  The patient should drink water after 2-3 bites and take medications with liberal amounts of water. Furthermore after eating and taking medications, the patient should remain in upright position for 5-10 minutes.  4. High fiber, low fat diet with liberal water intake.   5. Avoid cold foods and beverages, foods and liquids should be room temp.  6. CT in the future if no improvement of swallowing.  7. Stool for H. Pylori clearance. Hold Pantoprazole for 2 weeks prior to doing study.  8. Follow up: 3 months or sooner if any problems

## 2020-12-16 NOTE — PROGRESS NOTES
"     Follow Up Note     Date: 2020   Patient Name: Xuan Baron  MRN: [id number]  : 1980     Primary Care Provider: Jose Ng DO     Chief Complaint:    Chief Complaint   Patient presents with   • Follow-up     History of present illness:   2020  Xuan Baron is a 40 y.o. female who is here today for follow up after EGD.     Swallowing has gradually improved. She is taking Pantoprazole with improvement of reflux. Dr. Ng sent in treatment for H. Pylori, she finished antibiotics about 2 weeks ago.  Constipation improved with Miralax. No abdominal pain or rectal bleeding.    Interval History:  10/13/2020  The patient has a history of difficulty swallowing for many years that has gradually worsened. She had upper GI in 2017 with reflux. About a month ago, she started having worsening of difficulty swallowing along with episodes of \"esophagus spasms\". She has difficulty swallowing on a daily basis swallowing meats and breads, no difficulty swallowing liquids. She randomly has the episodes of \"esophagus spasms\" that can even occur in the middle of the night. She has a history of reflux and was recently started on PPI. The patient denies abdominal pain, nausea or vomiting. There is no history of weight loss.     The patient has a history of constipation for many years. She does not take anything for constipation on a regular basis. She has a bowel movement daily, but it is usually \"very small pellets\". No rectal bleeding     The patient has not had a colonoscopy or EGD in the past. There is no family history of GI malignancy.    Subjective      Past Medical History:   Diagnosis Date   • Anemia    • Asthma     Patient reported \"a touch years ago\"   • Back pain    • Body piercing     ears   • Dysphagia     Patient reported noted more with breads and cakes but noted intermittently with various textures   • Epigastric pain    • GERD (gastroesophageal reflux disease)    • Hypotension    • Kidney " "infection     Reported recurrent UTI   • Ovarian cyst    • PONV (postoperative nausea and vomiting)    • Seasonal allergies    • Sleep apnea     Patient reported had testing done that was \"borderline\" and that no CPAP was warranted   • Tattoo    • Wears contact lenses     Patient advised that contacts will have to be removed DOS   • Wears glasses      Past Surgical History:   Procedure Laterality Date   • ENDOSCOPY     • ENDOSCOPY N/A 11/6/2020    Procedure: ESOPHAGOGASTRODUODENOSCOPY WITH ARGON COAGULATION AND CLIP X1, BIOPSIES, AND DILATATION;  Surgeon: Mer Morrow MD;  Location: [facility] ENDOSCOPY;  Service: Gastroenterology;  Laterality: N/A;   • LASER ABLATION OF THE CERVIX     • SUBTOTAL HYSTERECTOMY     • WISDOM TOOTH EXTRACTION       Family History   Problem Relation Age of Onset   • Heart attack Mother    • Stroke Mother    • Thyroid disease Mother    • Diabetes Father    • Heart attack Father    • Hypertension Father    • Obesity Father    • Migraines Son    • Colon cancer Neg Hx      Social History     Socioeconomic History   • Marital status:      Spouse name: Not on file   • Number of children: Not on file   • Years of education: Not on file   • Highest education level: Not on file   Occupational History     Employer: [employer]   Tobacco Use   • Smoking status: Never Smoker   • Smokeless tobacco: Never Used   Substance and Sexual Activity   • Alcohol use: No   • Drug use: No   • Sexual activity: Defer       Current Outpatient Medications:   •  amLODIPine (NORVASC) 2.5 MG tablet, Take 1 tablet by mouth Daily., Disp: 90 tablet, Rfl: 1  •  pantoprazole (PROTONIX) 40 MG EC tablet, Take 1 tablet by mouth Daily., Disp: 90 tablet, Rfl: 1  •  Polyethylene Glycol powder, Take 1 cap full (17 grams) in 8 oz of noncarbonated beverage and drink once daily (Patient taking differently: Take  by mouth Daily. Take 1 cap full (17 grams) in 8 oz of noncarbonated beverage and " drink once daily), Disp: 500 g, Rfl: 3  •  traZODone (DESYREL) 50 MG tablet, Take 1 tablet by mouth Every Night., Disp: 90 tablet, Rfl: 1     No Known Allergies     Review of Systems   Constitutional: Negative for appetite change and unexpected weight loss.   HENT: Positive for trouble swallowing.    Eyes: Negative for blurred vision.   Respiratory: Negative for choking and chest tightness.    Cardiovascular: Negative for leg swelling.   Gastrointestinal: Positive for constipation and GERD. Negative for abdominal distention, abdominal pain, anal bleeding, blood in stool, diarrhea, nausea, rectal pain, vomiting and indigestion.   Endocrine: Negative for polyphagia.   Genitourinary: Negative for hematuria.   Musculoskeletal: Negative for arthralgias and myalgias.   Skin: Negative for rash.   Allergic/Immunologic: Negative for food allergies.   Neurological: Negative for dizziness, syncope and confusion.   Hematological: Does not bruise/bleed easily.   Psychiatric/Behavioral: Negative for depressed mood.      The following portions of the patient's history were reviewed and updated as appropriate: allergies, current medications, past family history, past medical history, past social history, past surgical history and problem list.    Objective     Physical Exam  Vitals signs and nursing note reviewed.   Constitutional:       General: She is awake. She is not in acute distress.     Appearance: Normal appearance. She is well-developed. She is not diaphoretic.   HENT:      Head: Normocephalic and atraumatic.      Right Ear: Hearing and external ear normal.      Left Ear: Hearing and external ear normal.      Nose: Nose normal.      Mouth/Throat:      Lips: Pink.      Mouth: Mucous membranes are not pale, not dry and not cyanotic. No oral lesions.      Pharynx: No oropharyngeal exudate.   Eyes:      General: Lids are normal.         Right eye: No discharge.         Left eye: No discharge.      Conjunctiva/sclera:  "Conjunctivae normal.   Neck:      Musculoskeletal: Neck supple. No edema.      Thyroid: No thyroid mass or thyromegaly.      Vascular: No JVD.      Trachea: Trachea normal.   Cardiovascular:      Rate and Rhythm: Normal rate and regular rhythm.      Heart sounds: Normal heart sounds and S2 normal. No murmur. No friction rub. No gallop. No S3 sounds.    Pulmonary:      Effort: Pulmonary effort is normal. No respiratory distress.      Breath sounds: Normal breath sounds.   Chest:      Chest wall: No tenderness.   Abdominal:      General: Bowel sounds are normal. There is no distension.      Palpations: Abdomen is not rigid. There is no hepatomegaly, splenomegaly or mass.      Tenderness: There is no abdominal tenderness. There is no guarding or rebound.      Hernia: No hernia is present.   Musculoskeletal: Normal range of motion.   Lymphadenopathy:      Cervical: No cervical adenopathy.      Upper Body:      Left upper body: No supraclavicular adenopathy.   Skin:     General: Skin is warm and dry.      Coloration: Skin is not pale.      Findings: No rash.      Nails: There is no clubbing.     Neurological:      Mental Status: She is alert and oriented to person, place, and time.      Cranial Nerves: No cranial nerve deficit.      Sensory: No sensory deficit.   Psychiatric:         Mood and Affect: Mood normal.         Speech: Speech normal.         Behavior: Behavior is cooperative.       Vitals:    12/16/20 1023   BP: 109/59   Pulse: 77   Resp: 16   Temp: 98 °F (36.7 °C)   Weight: 72.1 kg (159 lb)   Height: 166.4 cm (65.5\")     Results Review:   I reviewed the patient's new clinical results.    Admission on 11/06/2020, Discharged on 11/06/2020   Component Date Value Ref Range Status   • Case Report 11/06/2020    Final                    Value:Surgical Pathology Report                         Case: [id number]                                  Authorizing Provider:  Mer Morrow MD  Collected:           " 11/06/2020 09:07 AM          Ordering Location:     [facility]    Received:            11/06/2020 01:55 PM                                 [facility]                                                                    Pathologist:           Maurizio Rosales MD                                                             Specimens:   1) - Gastric, Body, antrum and body biopsies for H.Pylori                                           2) - Esophagus, random esophageal biopsies for EOE                                                  3) - Esophagus, GE junctions for Barretts                                                 • Final Diagnosis 11/06/2020    Final                    Value:This result contains rich text formatting which cannot be displayed here.   Lab on 11/03/2020   Component Date Value Ref Range Status   • SARS-CoV-2 ANA 11/03/2020 Not Detected  Not Detected Final   Orders Only on 10/01/2020   Component Date Value Ref Range Status   • Urine Culture 10/01/2020 Final report*  Final   • Result 1 10/01/2020 Escherichia coli*  Final    Comment: 10,000-25,000 colony forming units per mL  Cefazolin <=4 ug/mL  Cefazolin with an KALIE <=16 predicts susceptibility to the oral agents  cefaclor, cefdinir, cefpodoxime, cefprozil, cefuroxime, cephalexin,  and loracarbef when used for therapy of uncomplicated urinary tract  infections due to E. coli, Klebsiella pneumoniae, and Proteus  mirabilis.     • Susceptibility Testing 10/01/2020 Comment   Final    Comment:       ** S = Susceptible; I = Intermediate; R = Resistant **                     P = Positive; N = Negative              MICS are expressed in micrograms per mL     Antibiotic                 RSLT#1    RSLT#2    RSLT#3    RSLT#4  Amoxicillin/Clavulanic Acid    S  Ampicillin                     S  Cefepime                       S  Ceftriaxone                    S  Cefuroxime                     S  Ciprofloxacin                  S  Ertapenem                       S  Gentamicin                     S  Imipenem                       S  Levofloxacin                   S  Meropenem                      S  Nitrofurantoin                 S  Piperacillin/Tazobactam        S  Tetracycline                   S  Tobramycin                     S  Trimethoprim/Sulfa             S     Office Visit on 09/30/2020   Component Date Value Ref Range Status   • Color 09/30/2020 Orange* Yellow, Straw, Dark Yellow, Anayeli Final   • Clarity, UA 09/30/2020 Cloudy* Clear Final   • Specific Gravity  09/30/2020 1.015  1.005 - 1.030 Final   • pH, Urine 09/30/2020 5.5  5.0 - 8.0 Final   • Leukocytes 09/30/2020 500 Sosa/ul* Negative Final   • Nitrite, UA 09/30/2020 Positive* Negative Final   • Protein, POC 09/30/2020 Negative  Negative mg/dL Final   • Glucose, UA 09/30/2020 Negative  Negative, 1000 mg/dL (3+) mg/dL Final   • Ketones, UA 09/30/2020 Trace* Negative Final   • Urobilinogen, UA 09/30/2020 4 E.U./dL * Normal Final   • Bilirubin 09/30/2020 Moderate (2+)* Negative Final   • Blood, UA 09/30/2020 Negative  Negative Final      No radiology results for the last 90 days.     EGD dated 11/6/2020 normal oropharynx.  Z-line regular.  [place]-colored mucosa suspicious for short segment Arnold's, biopsied.  No endoscopic esophageal abnormality to explain patient's dysphagia except a mild external compression at upper esophageal likely from vessel, ?  Aorta.  Esophagus dilated, biopsied.  Erythematous mucosa in the antrum. One nonbleeding angioectasia versus gastric erosion in the stomach.  Treated with argon beam coagulation.  Clip placed.  Normal duodenum.  Antrum body biopsy with gastric antral and fundic type mucosa with moderate chronic active gastritis.  Negative for intestinal metaplasia, dysplasia.  Positive H. pylori.  Esophagus biopsies random with squamous mucosa with features suggestive of reflux esophagitis.  Negative for glandular type mucosa, intestinal metaplasia or dysplasia.  GE junction  "biopsies with features consistent with reflux esophagitis.  Glandular mucosa with mild acute and chronic inflammation.  Negative for intestinal metaplasia or dysplasia.  Negative for specific microorganisms.    Assessment / Plan      1. Gastroesophageal reflux disease with esophagitis without hemorrhage  Reasonable control with Pantoprazole. [place] colored mucosa suspicious for short segment Arnold's noted. Biopsies consistent with reflux esophagitis, no intestinal metaplasia or dysplasia, no Arnold's.  Anti-reflux measures  Continue Pantoprazole 40 mg daily.    - pantoprazole (PROTONIX) 40 MG EC tablet; Take 1 tablet by mouth Daily.  Dispense: 90 tablet; Refill: 1    10/13/2020  Longstanding history of reflux.  The patient was recently started on pantoprazole 40 mg, but she has not noticed any difference as she just started taking it.  Discussed anti-reflux measures.  Continue PPI for now.    2. Helicobacter pylori infection  Biopsies positive for H. Pylori. Patient has completed treatment as prescribed by Dr. Ng.   Stool for H. Pylori antigen to check for clearance.    - H. Pylori Antigen, Stool - Stool, Per Rectum; Future    3. Esophageal dysphagia  Improving. No esophageal abnormality noted to explain patient's dysphagia except mild external compression at upper esophagus, likely from a vessel, ?aorta. The patient feels she has esophageal spasms at times.  Advised to avoid cold foods and beverages, foods and drinks should be room temp.  Discussed CT Chest to rule out other pathology-the patient wishes to wait until follow up if symptoms do not continue to improve.    10/13/2020  The patient has a history of difficulty swallowing for over 4 years that has gradually worsened.  About a month ago she began having \"esophagus spasms\" that can occur in the middle of the night.  The patient has difficulty swallowing meats and breads on a daily basis.  There is no difficulty swallowing liquids.  There is no history " of weight loss. The patient had fluoroscopic upper GI in 2017 with mild gastroesophageal reflux into the distal esophagus noted. The patient was recently started on Pantoprazole 40 mg, but she has not noticed any change in her symptoms.  EGD to rule out esophageal stricture.    4. Chronic idiopathic constipation  Reasonable control with Miralax. No history of rectal bleeding. No history of abdominal pain. Previous labs and TSH unremarkable.  High fiber, low fat diet with liberal water intake.  Continue Miralax 17 grams daily.  May use stool softeners as needed.  Will monitor for now.    10/13/2020  The patient has a history of constipation for many years.  She is not taking anything on a regular basis for constipation.  There is no history of rectal bleeding.  The patient denies abdominal pain. CBC, CMP, TSH in the past unremarkable. Previous x-ray with increased stool scattered throughout the colon.  High fiber diet with liberal water intake.   Miralax 17 grams daily.  The patient may take stool softeners, 1-2 per day, if needed.    Patient Instructions   1. Antireflux measures: Avoid fried, fatty foods, alcohol, chocolate, coffee, tea,  soft drinks, peppermint and spearmint, spicy foods, tomatoes and tomato based foods, onion based foods, and smoking. Other antireflux measures include weight reduction if overweight, avoiding tight clothing around the abdomen, elevating the head of the bed 6 inches with blocks under the head board, and don't drink or eat before going to bed and avoid lying down immediately after meals.  2. Pantoprazole 40 mg 1 by mouth in the am 30 minutes before breakfast.  3. Dietary instructions.  The patient should eat relatively soft diet, and should eat in upright position and chew well.  The patient should drink water after 2-3 bites and take medications with liberal amounts of water. Furthermore after eating and taking medications, the patient should remain in upright position for 5-10  minutes.  4. High fiber, low fat diet with liberal water intake.   5. Avoid cold foods and beverages, foods and liquids should be room temp.  6. CT in the future if no improvement of swallowing.  7. Stool for H. Pylori clearance. Hold Pantoprazole for 2 weeks prior to doing study.  8. Follow up: 3 months or sooner if any problems    Myrna Jordan, APRN  12/16/2020    Please note that portions of this note may have been completed with a voice recognition program. Efforts were made to edit the dictations, but occasionally words are mistranscribed.

## 2021-01-05 PROCEDURE — 87338 HPYLORI STOOL AG IA: CPT | Performed by: NURSE PRACTITIONER

## 2021-01-07 LAB — H PYLORI AG STL QL IA: NEGATIVE

## 2021-04-06 ENCOUNTER — OFFICE VISIT (OUTPATIENT)
Dept: FAMILY MEDICINE CLINIC | Facility: CLINIC | Age: 41
End: 2021-04-06

## 2021-04-06 VITALS
TEMPERATURE: 97.4 F | DIASTOLIC BLOOD PRESSURE: 60 MMHG | WEIGHT: 154 LBS | HEIGHT: 66 IN | OXYGEN SATURATION: 100 % | HEART RATE: 68 BPM | BODY MASS INDEX: 24.75 KG/M2 | SYSTOLIC BLOOD PRESSURE: 110 MMHG

## 2021-04-06 DIAGNOSIS — G89.29 CHRONIC PAIN OF BOTH KNEES: Primary | ICD-10-CM

## 2021-04-06 DIAGNOSIS — M25.561 CHRONIC PAIN OF BOTH KNEES: Primary | ICD-10-CM

## 2021-04-06 DIAGNOSIS — R53.83 FATIGUE, UNSPECIFIED TYPE: ICD-10-CM

## 2021-04-06 DIAGNOSIS — F51.04 PSYCHOPHYSIOLOGICAL INSOMNIA: ICD-10-CM

## 2021-04-06 DIAGNOSIS — I73.00 RAYNAUD'S PHENOMENON WITHOUT GANGRENE: ICD-10-CM

## 2021-04-06 DIAGNOSIS — M25.50 ARTHRALGIA, UNSPECIFIED JOINT: ICD-10-CM

## 2021-04-06 DIAGNOSIS — K21.9 GASTROESOPHAGEAL REFLUX DISEASE WITHOUT ESOPHAGITIS: ICD-10-CM

## 2021-04-06 DIAGNOSIS — M25.562 CHRONIC PAIN OF BOTH KNEES: Primary | ICD-10-CM

## 2021-04-06 PROCEDURE — 99214 OFFICE O/P EST MOD 30 MIN: CPT | Performed by: NURSE PRACTITIONER

## 2021-04-06 RX ORDER — HYDROXYZINE HYDROCHLORIDE 25 MG/1
TABLET, FILM COATED ORAL
Qty: 60 TABLET | Refills: 2 | Status: SHIPPED | OUTPATIENT
Start: 2021-04-06 | End: 2021-12-10

## 2021-04-06 NOTE — PROGRESS NOTES
Subjective     Chief Complaint:    Chief Complaint   Patient presents with   • Insomnia   • Heartburn       History of Present Illness:   Hasn't been taking the Trazodone as often, doesn't like the grogginess the next morning.  She feels slow moving.  ZzzQuil, Melatonin, Sleepy Time tea.  Will go to sleep, but will wake up.  Mind will start racing and it may take hours to fall back asleep.  Is happening multiple times a week.  Has tried decreasing her liquids in the evening because she will get up to urinate.  Hasn't noticed a change in her sleep pattern.  Will try to go to bed by 10 PM, tries to take the medicine or drink the tea around 9 PM.  She has fatigue from when she wakes up to until she goes to sleep.  Is having moderate control with Protonix currently.  She finished the therapy for H. Pylori. Followed up with GI, stopped the Protonix for 2 weeks to get retested for H. Pylori.  Restarted and then got COVID in February, only had headache for days.  She stopped all of her meds for a while because she felt awful and then restarted.  Has tried to decrease spicy foods, and is trying to follow GI's recommendations.  Reports some infrequent heartburn, will take Amrita-selter for, but hasn't had to use often.  Reports that she is working out and back on a routine schedule.  Is having trouble with her left knee, will sometimes become weak and give out.  Has noticed grinding in her knee joints, worse in the left. The knee problems have been present for a while, is seeking care because they are getting worse.  Has never had x-rays.  Hx of arthritis in her low back and right shoulder region.  Is wondering if she has arthritis in her knees.  Previous work-up in 1/2019 for autoimmune disorders, but was told it was negative.  This was done due to sudden onset of Raynaud's.  Currently takes Amlodipine 2.5 mg, is having episodes less frequently.      Review of Systems  Gen- No fevers, chills  CV- No chest pain,  "palpitations  Resp- No cough, dyspnea  GI- No N/V/D, abd pain  Neuro-No dizziness, headaches      I have reviewed and/or updated the patient's past medical, surgical, family, social history and problem list as appropriate.     Medications:    Current Outpatient Medications:   •  amLODIPine (NORVASC) 2.5 MG tablet, Take 1 tablet by mouth Daily., Disp: 90 tablet, Rfl: 1  •  pantoprazole (PROTONIX) 40 MG EC tablet, Take 1 tablet by mouth Daily., Disp: 90 tablet, Rfl: 1  •  Polyethylene Glycol powder, Take 1 cap full (17 grams) in 8 oz of noncarbonated beverage and drink once daily (Patient taking differently: Take  by mouth Daily. Take 1 cap full (17 grams) in 8 oz of noncarbonated beverage and drink once daily), Disp: 500 g, Rfl: 3  •  hydrOXYzine (ATARAX) 25 MG tablet, 1-2 po hs prn sleep, Disp: 60 tablet, Rfl: 2    Allergies:  No Known Allergies    Objective     Vital Signs:   Vitals:    04/06/21 1645   BP: 110/60   Pulse: 68   Temp: 97.4 °F (36.3 °C)   SpO2: 100%   Weight: 69.9 kg (154 lb)   Height: 166.4 cm (65.5\")   PainSc: 0-No pain       Physical Exam:    Physical Exam  Vitals and nursing note reviewed.   Constitutional:       General: She is not in acute distress.     Appearance: She is normal weight. She is not ill-appearing.   HENT:      Head: Normocephalic and atraumatic.   Neck:      Vascular: No carotid bruit.   Cardiovascular:      Rate and Rhythm: Normal rate and regular rhythm.      Pulses: Normal pulses.      Heart sounds: Normal heart sounds.   Pulmonary:      Effort: Pulmonary effort is normal.      Breath sounds: Normal breath sounds and air entry.   Abdominal:      General: Abdomen is flat. Bowel sounds are normal. There is no distension.      Palpations: Abdomen is soft.      Tenderness: There is no abdominal tenderness.   Musculoskeletal:      Cervical back: Neck supple. No tenderness.      Right knee: Crepitus present.      Left knee: Crepitus present.      Right lower leg: No edema.      Left " lower leg: No edema.      Comments: Minimal crepitus with passive ROM.  Crepitus increased with active ROM.   Lymphadenopathy:      Cervical: No cervical adenopathy.   Skin:     General: Skin is warm.   Neurological:      General: No focal deficit present.      Mental Status: She is alert and oriented to person, place, and time.   Psychiatric:         Mood and Affect: Mood normal.         Behavior: Behavior normal.         Assessment / Plan     Assessment/Plan:   Problem List Items Addressed This Visit        Cardiac and Vasculature    Raynaud's phenomenon without gangrene    Relevant Medications    amLODIPine (NORVASC) 2.5 MG tablet       Gastrointestinal Abdominal     Gastroesophageal reflux disease    Relevant Medications    pantoprazole (PROTONIX) 40 MG EC tablet      Other Visit Diagnoses     Chronic pain of both knees    -  Primary    Relevant Orders    XR Knee 3 View Bilateral    Arthralgia, unspecified joint        Relevant Orders    [place] With / DsDNA, RNP, Sjogrens A / B, Smith    C-reactive Protein    Cyclic Citrul Peptide Antibody, IgG / IgA    Sedimentation Rate    RHEUMATOID FACTOR    Vitamin D 25 Hydroxy    Psychophysiological insomnia        Relevant Medications    hydrOXYzine (ATARAX) 25 MG tablet    Fatigue, unspecified type        Relevant Orders    TSH Rfx On Abnormal To Free T4        --Orders as above  --Continue Protonix. - Avoid eating spicy foods, Avoid caffeinated beverages, Avoid carbonated beverage, Do not eat or drink within 2-3 hours of going to bed in the evening, Elevate head of bed on 4-6 inch blocks, Eat smaller portions of food throughout the day  --RTC for labs  --Trial hydroxyzine for sleep  --Continue Norvasc for Raynaud's    Follow up:  Return in about 4 weeks (around 5/4/2021).    Electronically signed by MAL Taylor   04/06/2021 17:10 EDT      Please note that portions of this note may have been completed with a voice recognition program. Efforts were made to edit  the dictations, but occasionally words are mistranscribed.

## 2021-04-14 ENCOUNTER — HOSPITAL ENCOUNTER (OUTPATIENT)
Dept: GENERAL RADIOLOGY | Facility: HOSPITAL | Age: 41
Discharge: HOME | End: 2021-04-14

## 2021-04-14 ENCOUNTER — LAB (OUTPATIENT)
Dept: LAB | Facility: HOSPITAL | Age: 41
End: 2021-04-14

## 2021-04-14 DIAGNOSIS — M25.562 CHRONIC PAIN OF BOTH KNEES: ICD-10-CM

## 2021-04-14 DIAGNOSIS — G89.29 CHRONIC PAIN OF BOTH KNEES: ICD-10-CM

## 2021-04-14 DIAGNOSIS — M25.561 CHRONIC PAIN OF BOTH KNEES: ICD-10-CM

## 2021-04-14 PROCEDURE — 82306 VITAMIN D 25 HYDROXY: CPT | Performed by: NURSE PRACTITIONER

## 2021-04-14 PROCEDURE — 84443 ASSAY THYROID STIM HORMONE: CPT | Performed by: NURSE PRACTITIONER

## 2021-04-14 PROCEDURE — 73562 X-RAY EXAM OF KNEE 3: CPT

## 2021-04-14 PROCEDURE — 85652 RBC SED RATE AUTOMATED: CPT | Performed by: NURSE PRACTITIONER

## 2021-04-14 PROCEDURE — 86140 C-REACTIVE PROTEIN: CPT | Performed by: NURSE PRACTITIONER

## 2021-04-14 PROCEDURE — 86038 ANTINUCLEAR ANTIBODIES: CPT | Performed by: NURSE PRACTITIONER

## 2021-04-14 PROCEDURE — 86200 CCP ANTIBODY: CPT | Performed by: NURSE PRACTITIONER

## 2021-04-14 PROCEDURE — 86431 RHEUMATOID FACTOR QUANT: CPT | Performed by: NURSE PRACTITIONER

## 2021-05-07 ENCOUNTER — OFFICE VISIT (OUTPATIENT)
Dept: FAMILY MEDICINE CLINIC | Facility: CLINIC | Age: 41
End: 2021-05-07

## 2021-05-07 VITALS
HEART RATE: 78 BPM | OXYGEN SATURATION: 98 % | SYSTOLIC BLOOD PRESSURE: 110 MMHG | HEIGHT: 66 IN | BODY MASS INDEX: 23.78 KG/M2 | DIASTOLIC BLOOD PRESSURE: 70 MMHG | TEMPERATURE: 97.4 F | WEIGHT: 148 LBS

## 2021-05-07 DIAGNOSIS — F41.1 GAD (GENERALIZED ANXIETY DISORDER): Primary | ICD-10-CM

## 2021-05-07 PROCEDURE — 99213 OFFICE O/P EST LOW 20 MIN: CPT | Performed by: NURSE PRACTITIONER

## 2021-05-07 RX ORDER — FLUOXETINE HYDROCHLORIDE 20 MG/1
20 CAPSULE ORAL DAILY
Qty: 30 CAPSULE | Refills: 1 | Status: SHIPPED | OUTPATIENT
Start: 2021-05-07 | End: 2021-06-02

## 2021-05-07 NOTE — PROGRESS NOTES
"      Subjective     Chief Complaint:    Chief Complaint   Patient presents with   • Follow-up     talk about labs and x rays        History of Present Illness:   She reports worsening anxiety. Feels overwhelemed. Started after her mom passed away 6 years ago. She has a lot on her plate. She took on a lot of her moms responsibility. She takes care of her dad who has health issues. She is working and it grad school. Has chronic sleep issues as well. She feels like its related to her anxiety. She feels like everything is a chore. She is snapping at people at times.  No SI/HI.  Some feelings of guilt.  No appetite changes.    Review of Systems  Gen- No fevers, chills  CV- No chest pain, palpitations  Resp- No cough, dyspnea  GI- No N/V/D, abd pain  Neuro-No dizziness, headaches      I have reviewed and/or updated the patient's past medical, surgical, family, social history and problem list as appropriate.     Medications:    Current Outpatient Medications:   •  amLODIPine (NORVASC) 2.5 MG tablet, Take 1 tablet by mouth Daily., Disp: 90 tablet, Rfl: 1  •  hydrOXYzine (ATARAX) 25 MG tablet, 1-2 po hs prn sleep, Disp: 60 tablet, Rfl: 2  •  pantoprazole (PROTONIX) 40 MG EC tablet, Take 1 tablet by mouth Daily., Disp: 90 tablet, Rfl: 1  •  Polyethylene Glycol powder, Take 1 cap full (17 grams) in 8 oz of noncarbonated beverage and drink once daily (Patient taking differently: Take  by mouth Daily. Take 1 cap full (17 grams) in 8 oz of noncarbonated beverage and drink once daily), Disp: 500 g, Rfl: 3  •  FLUoxetine (PROzac) 20 MG capsule, Take 1 capsule by mouth Daily., Disp: 30 capsule, Rfl: 1    Allergies:  No Known Allergies    Objective     Vital Signs:   Vitals:    05/07/21 1456   BP: 110/70   Pulse: 78   Temp: 97.4 °F (36.3 °C)   SpO2: 98%   Weight: 67.1 kg (148 lb)   Height: 166.4 cm (65.5\")   PainSc: 0-No pain       Physical Exam:    Physical Exam  Vitals and nursing note reviewed.   Constitutional:       Appearance: " She is well-developed.   HENT:      Head: Normocephalic and atraumatic.   Eyes:      Pupils: Pupils are equal, round, and reactive to light.   Cardiovascular:      Rate and Rhythm: Normal rate and regular rhythm.      Heart sounds: Normal heart sounds.   Pulmonary:      Effort: Pulmonary effort is normal.      Breath sounds: Normal breath sounds.   Abdominal:      General: Bowel sounds are normal. There is no distension.      Palpations: Abdomen is soft.      Tenderness: There is no abdominal tenderness.   Musculoskeletal:      Cervical back: Neck supple.   Skin:     General: Skin is warm and dry.      Capillary Refill: Capillary refill takes less than 2 seconds.   Neurological:      General: No focal deficit present.      Mental Status: She is alert and oriented to person, place, and time.   Psychiatric:         Mood and Affect: Mood normal.         Behavior: Behavior normal.         Assessment / Plan     Assessment/Plan:   Problem List Items Addressed This Visit     None      Visit Diagnoses     RAMONE (generalized anxiety disorder)    -  Primary    Relevant Medications    FLUoxetine (PROzac) 20 MG capsule        -- start prozac. - Medication discussed, advised to go to ED for any SI/HI, encouraged counseling/CBT, encouraged clean eating and exercise      Follow up:  Return in about 4 weeks (around 6/4/2021).      Electronically signed by MAL Taylor   05/07/2021 15:12 EDT      Please note that portions of this note may have been completed with a voice recognition program. Efforts were made to edit the dictations, but occasionally words are mistranscribed.

## 2021-06-02 ENCOUNTER — TELEPHONE (OUTPATIENT)
Dept: FAMILY MEDICINE CLINIC | Facility: CLINIC | Age: 41
End: 2021-06-02

## 2021-06-02 DIAGNOSIS — F41.1 GAD (GENERALIZED ANXIETY DISORDER): ICD-10-CM

## 2021-06-02 RX ORDER — FLUOXETINE 10 MG/1
10 CAPSULE ORAL DAILY
Qty: 30 CAPSULE | Refills: 2 | Status: SHIPPED | OUTPATIENT
Start: 2021-06-02 | End: 2021-10-26

## 2021-06-02 NOTE — TELEPHONE ENCOUNTER
The D1G message was sent to Dr. Ng by mistake.    I have notified and rerouted the message to [employer].

## 2021-06-02 NOTE — TELEPHONE ENCOUNTER
Caller: Xuan Baron    Relationship: Self    Best call back number:     What is the best time to reach you: ANYTIME    Who are you requesting to speak with (clinical staff, provider,  specific staff member): CLINICAL STAFF OR PROVIDER    What was the call regarding: PATIENT STATES THAT SHE SENT A MESSAGE THROUGH [facility] AND HAS NO RESPOND. PATIENT HAS CONCERNS REGARDING MEDICATION PROZAC AND WOULD LIKE TO BE ADVISED    Do you require a callback: YES

## 2021-10-26 ENCOUNTER — OFFICE VISIT (OUTPATIENT)
Dept: FAMILY MEDICINE CLINIC | Facility: CLINIC | Age: 41
End: 2021-10-26

## 2021-10-26 VITALS
BODY MASS INDEX: 25.07 KG/M2 | DIASTOLIC BLOOD PRESSURE: 70 MMHG | TEMPERATURE: 97.7 F | HEART RATE: 73 BPM | SYSTOLIC BLOOD PRESSURE: 110 MMHG | WEIGHT: 156 LBS | OXYGEN SATURATION: 99 % | HEIGHT: 66 IN

## 2021-10-26 DIAGNOSIS — F41.1 GAD (GENERALIZED ANXIETY DISORDER): Primary | ICD-10-CM

## 2021-10-26 DIAGNOSIS — Z23 NEED FOR VACCINATION: ICD-10-CM

## 2021-10-26 PROCEDURE — 90686 IIV4 VACC NO PRSV 0.5 ML IM: CPT | Performed by: NURSE PRACTITIONER

## 2021-10-26 PROCEDURE — 90471 IMMUNIZATION ADMIN: CPT | Performed by: NURSE PRACTITIONER

## 2021-10-26 PROCEDURE — 99213 OFFICE O/P EST LOW 20 MIN: CPT | Performed by: NURSE PRACTITIONER

## 2021-10-26 RX ORDER — BUPROPION HYDROCHLORIDE 150 MG/1
150 TABLET ORAL EVERY MORNING
Qty: 30 TABLET | Refills: 1 | Status: SHIPPED | OUTPATIENT
Start: 2021-10-26 | End: 2021-11-23 | Stop reason: SDUPTHER

## 2021-10-26 RX ORDER — DOXYCYCLINE HYCLATE 100 MG/1
100 CAPSULE ORAL 2 TIMES DAILY
COMMUNITY
End: 2021-12-10

## 2021-10-26 NOTE — PROGRESS NOTES
"      Subjective     Chief Complaint:    Chief Complaint   Patient presents with   • Follow-up     anxiety; patient dc'd prozac due to side effects       History of Present Illness:   She has hx of RAMONE. Was taking prozac but stopped due to side effects. She was yawning a lot. Also had sexual side effects. She has been off prozac for about a month. She notes recently has had increase in not being able to deal well with stress. She is not sleeping as well. Her mind will race. No panic attacks.       Review of Systems  Gen- No fevers, chills  CV- No chest pain, palpitations  Resp- No cough, dyspnea  GI- No N/V/D, abd pain  Neuro-No dizziness, headaches      I have reviewed and/or updated the patient's past medical, surgical, family, social history and problem list as appropriate.     Medications:    Current Outpatient Medications:   •  amLODIPine (NORVASC) 2.5 MG tablet, Take 1 tablet by mouth Daily., Disp: 90 tablet, Rfl: 1  •  doxycycline (VIBRAMYCIN) 100 MG capsule, Take 100 mg by mouth 2 (Two) Times a Day., Disp: , Rfl:   •  hydrOXYzine (ATARAX) 25 MG tablet, 1-2 po hs prn sleep, Disp: 60 tablet, Rfl: 2  •  pantoprazole (PROTONIX) 40 MG EC tablet, Take 1 tablet by mouth Daily., Disp: 90 tablet, Rfl: 1  •  Polyethylene Glycol powder, Take 1 cap full (17 grams) in 8 oz of noncarbonated beverage and drink once daily (Patient taking differently: Take  by mouth Daily. Take 1 cap full (17 grams) in 8 oz of noncarbonated beverage and drink once daily), Disp: 500 g, Rfl: 3  •  buPROPion XL (Wellbutrin XL) 150 MG 24 hr tablet, Take 1 tablet by mouth Every Morning., Disp: 30 tablet, Rfl: 1    Allergies:  No Known Allergies    Objective     Vital Signs:   Vitals:    10/26/21 1707   BP: 110/70   Pulse: 73   Temp: 97.7 °F (36.5 °C)   SpO2: 99%   Weight: 70.8 kg (156 lb)   Height: 166.4 cm (65.5\")       Physical Exam:    Physical Exam  Vitals and nursing note reviewed.   Constitutional:       Appearance: Normal appearance. She " is well-developed.   HENT:      Head: Normocephalic and atraumatic.   Eyes:      Pupils: Pupils are equal, round, and reactive to light.   Cardiovascular:      Rate and Rhythm: Normal rate and regular rhythm.      Heart sounds: Normal heart sounds.   Pulmonary:      Effort: Pulmonary effort is normal.      Breath sounds: Normal breath sounds.   Abdominal:      General: Bowel sounds are normal. There is no distension.      Palpations: Abdomen is soft.      Tenderness: There is no abdominal tenderness.   Musculoskeletal:      Cervical back: Neck supple.   Skin:     General: Skin is warm and dry.      Capillary Refill: Capillary refill takes less than 2 seconds.   Neurological:      General: No focal deficit present.      Mental Status: She is alert and oriented to person, place, and time.   Psychiatric:         Mood and Affect: Mood normal.         Behavior: Behavior normal.         Body mass index is 25.56 kg/m².    Assessment / Plan     Assessment/Plan:   Problem List Items Addressed This Visit     None      Visit Diagnoses     RAMONE (generalized anxiety disorder)    -  Primary    Relevant Medications    buPROPion XL (Wellbutrin XL) 150 MG 24 hr tablet        -- trail wellbutrin, medication discussed      Follow up:  Return in about 4 weeks (around 11/23/2021).      Electronically signed by MAL Taylor   10/26/2021 17:31 EDT      Please note that portions of this note may have been completed with a voice recognition program. Efforts were made to edit the dictations, but occasionally words are mistranscribed.

## 2021-11-23 DIAGNOSIS — F41.1 GAD (GENERALIZED ANXIETY DISORDER): ICD-10-CM

## 2021-11-23 RX ORDER — BUPROPION HYDROCHLORIDE 150 MG/1
150 TABLET ORAL EVERY MORNING
Qty: 30 TABLET | Refills: 1 | Status: SHIPPED | OUTPATIENT
Start: 2021-11-23 | End: 2021-12-10 | Stop reason: SDUPTHER

## 2021-11-23 NOTE — TELEPHONE ENCOUNTER
Caller: Xuan Baron    Relationship: Self    Best call back number: [phone]    Requested Prescriptions:   Requested Prescriptions     Pending Prescriptions Disp Refills   • buPROPion XL (Wellbutrin XL) 150 MG 24 hr tablet 30 tablet 1     Sig: Take 1 tablet by mouth Every Morning.        Pharmacy where request should be sent: [id number] - [place], [place] - [address] - [phone]  - [phone] FX     Additional details provided by patient: PATIENT HAS 4 DAYS REMAINING. HER SON WAS EXPOSED TO COVID SO SHE HAD TO RESCHEDULE HER APPOINTMENT FROM 11/23/21 TO 12/10/21. PATIENT NEEDS HER MEDICATION PRIOR TO HER APPOINTMENT     Does the patient have less than a 3 day supply:  [x] Yes  [] No    Mir Foley Rep   11/23/21 08:51 EST

## 2021-12-10 ENCOUNTER — OFFICE VISIT (OUTPATIENT)
Dept: FAMILY MEDICINE CLINIC | Facility: CLINIC | Age: 41
End: 2021-12-10

## 2021-12-10 VITALS
TEMPERATURE: 98.3 F | HEART RATE: 74 BPM | SYSTOLIC BLOOD PRESSURE: 90 MMHG | HEIGHT: 65 IN | DIASTOLIC BLOOD PRESSURE: 70 MMHG | OXYGEN SATURATION: 99 % | BODY MASS INDEX: 27.32 KG/M2 | WEIGHT: 164 LBS

## 2021-12-10 DIAGNOSIS — F41.1 GAD (GENERALIZED ANXIETY DISORDER): ICD-10-CM

## 2021-12-10 PROCEDURE — 99213 OFFICE O/P EST LOW 20 MIN: CPT | Performed by: NURSE PRACTITIONER

## 2021-12-10 RX ORDER — METRONIDAZOLE 10 MG/G
GEL TOPICAL
COMMUNITY
Start: 2021-09-22

## 2021-12-10 RX ORDER — BUPROPION HYDROCHLORIDE 300 MG/1
300 TABLET ORAL EVERY MORNING
Qty: 30 TABLET | Refills: 2 | Status: SHIPPED | OUTPATIENT
Start: 2021-12-10 | End: 2022-03-02 | Stop reason: SDUPTHER

## 2022-02-21 ENCOUNTER — TELEPHONE (OUTPATIENT)
Dept: FAMILY MEDICINE CLINIC | Facility: CLINIC | Age: 42
End: 2022-02-21

## 2022-02-21 NOTE — TELEPHONE ENCOUNTER
----- Message from Xuan Baron sent at 2/21/2022  3:21 PM EST -----  Regarding: GeneSight Test  Hello, I am just reaching out to see if you received the results from my GeneSite test. I recieved a letter in the mail stating my doctor had the results. I am sure it was sent to Dr. Ng, even though you ordered the test. I am very curious to hear the results. We had talked about a Telehealth appointment to go over the results but never scheduled anything. Is telehealth still a good option or do we need to arrange an appointment?    Thanks, Xuan

## 2022-03-02 ENCOUNTER — TELEMEDICINE (OUTPATIENT)
Dept: FAMILY MEDICINE CLINIC | Facility: CLINIC | Age: 42
End: 2022-03-02

## 2022-03-02 DIAGNOSIS — F41.8 PERFORMANCE ANXIETY: ICD-10-CM

## 2022-03-02 DIAGNOSIS — F41.1 GAD (GENERALIZED ANXIETY DISORDER): Primary | ICD-10-CM

## 2022-03-02 PROCEDURE — 99213 OFFICE O/P EST LOW 20 MIN: CPT | Performed by: NURSE PRACTITIONER

## 2022-03-02 RX ORDER — BUPROPION HYDROCHLORIDE 300 MG/1
300 TABLET ORAL EVERY MORNING
Qty: 90 TABLET | Refills: 1 | Status: SHIPPED | OUTPATIENT
Start: 2022-03-02

## 2022-03-02 RX ORDER — ALPRAZOLAM 0.5 MG/1
TABLET ORAL
Qty: 6 TABLET | Refills: 0 | Status: SHIPPED | OUTPATIENT
Start: 2022-03-02

## 2022-03-02 NOTE — PROGRESS NOTES
Subjective     Chief Complaint:  F/u anxiety    History of Present Illness:   F/u on anxiety and genesight results. Wellbutrin is helping, she does experience dry mouth but notes overall anxiety is doing well. Sleep is ok but still has some times where she has trouble. 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 SHe will have fast heart beat and her face and neck will get splotchy red.     Review of Systems  Gen- No fevers, chills  CV- No chest pain, palpitations  Resp- No cough, dyspnea  GI- No N/V/D, abd pain  Neuro-No dizziness, headaches      I have reviewed and/or updated the patient's past medical, surgical, family, social history and problem list as appropriate.     Medications:    Current Outpatient Medications:   •  ALPRAZolam (Xanax) 0.5 MG tablet, 1/2-1 po 30 minutes to 1 hour prior to anxiety provoking situations, Disp: 6 tablet, Rfl: 0  •  buPROPion XL (Wellbutrin XL) 300 MG 24 hr tablet, Take 1 tablet by mouth Every Morning., Disp: 90 tablet, Rfl: 1  •  metroNIDAZOLE (METROGEL) 1 % gel, APPLY TOPICALLY TO FACE EVERY NIGHT AT BEDTIME, Disp: , Rfl:     Allergies:  No Known Allergies    Objective     Vital Signs: There were no vitals filed for this visit.  There is no height or weight on file to calculate BMI.    Physical Exam:  Gen-no acute distress, video visit  Resp- normal WOB, on RA  Neuro-A&Ox3, face symmetrical, speech clear  Skin-no overt rashes noted  Psych-appropriate mood, cooperative         Assessment / Plan     Assessment/Plan:   Problem List Items Addressed This Visit     None      Visit Diagnoses     RAMONE (generalized anxiety disorder)    -  Primary    Relevant Medications    ALPRAZolam (Xanax) 0.5 MG tablet    buPROPion XL (Wellbutrin XL) 300 MG 24 hr tablet    Performance anxiety        Relevant Medications    ALPRAZolam (Xanax) 0.5 MG tablet        -- gene sight reviewed and  discussed. Will continue wellbutrin at current dose as she is doing well. Xanax prior to presentation. Will avoid propranolol due to gene interactions    Follow up:  6 months     Total Time of Encounter 15 minutes    Electronically signed by MAL Taylor   03/02/2022 14:17 EST      Please note that portions of this note may have been completed with a voice recognition program. Efforts were made to edit the dictations, but occasionally words are mistranscribed.

## 2022-03-11 ENCOUNTER — TELEPHONE (OUTPATIENT)
Dept: FAMILY MEDICINE CLINIC | Facility: CLINIC | Age: 42
End: 2022-03-11

## 2022-03-11 RX ORDER — VALACYCLOVIR HYDROCHLORIDE 1 G/1
2000 TABLET, FILM COATED ORAL 2 TIMES DAILY
Qty: 4 TABLET | Refills: 0 | Status: SHIPPED | OUTPATIENT
Start: 2022-03-11

## 2022-03-11 NOTE — TELEPHONE ENCOUNTER
Caller: Xuan Baron    Relationship: Self    Best call back number: [phone] (H)    What medication are you requesting:  SYMPTOMS      What are your current symptoms: FEVER BLISTER ON CORNER OF MOUTH, FULL BLOWN TODAY    How long have you been experiencing symptoms: 1 DAY    Have you had these symptoms before:   [x] Yes  [] No    Have you been treated for these symptoms before:  [] Yes  [x] No    If a prescription is needed, what is your preferred pharmacy and phone number: [id number] - [place], [place] - [address] AT [facility] OF [address] - [phone] [facility] [phone] KARINE RIGGS HAS TO LEAD A CONFERENCE IN 7 DAYS

## 2024-09-24 ENCOUNTER — OFFICE VISIT (OUTPATIENT)
Dept: FAMILY MEDICINE CLINIC | Facility: CLINIC | Age: 44
End: 2024-09-24
Payer: COMMERCIAL

## 2024-09-24 VITALS
DIASTOLIC BLOOD PRESSURE: 72 MMHG | SYSTOLIC BLOOD PRESSURE: 110 MMHG | HEIGHT: 65 IN | BODY MASS INDEX: 26.12 KG/M2 | HEART RATE: 66 BPM | WEIGHT: 156.8 LBS

## 2024-09-24 DIAGNOSIS — Z12.31 ENCOUNTER FOR SCREENING MAMMOGRAM FOR MALIGNANT NEOPLASM OF BREAST: ICD-10-CM

## 2024-09-24 DIAGNOSIS — M76.62 TENDONITIS, ACHILLES, LEFT: ICD-10-CM

## 2024-09-24 DIAGNOSIS — M77.52 LEFT CALCANEAL BURSITIS: ICD-10-CM

## 2024-09-24 DIAGNOSIS — M79.672 ACUTE FOOT PAIN, LEFT: Primary | ICD-10-CM

## 2024-09-24 PROCEDURE — 99214 OFFICE O/P EST MOD 30 MIN: CPT | Performed by: FAMILY MEDICINE

## 2024-09-24 RX ORDER — MINOXIDIL 2.5 MG/1
1 TABLET ORAL EVERY 12 HOURS SCHEDULED
COMMUNITY
Start: 2024-08-19

## 2024-09-24 RX ORDER — SULINDAC 200 MG/1
TABLET ORAL
Qty: 42 TABLET | Refills: 0 | Status: SHIPPED | OUTPATIENT
Start: 2024-09-24

## 2024-09-24 RX ORDER — CLOBETASOL PROPIONATE 0.5 MG/ML
SOLUTION TOPICAL
COMMUNITY
Start: 2024-08-19

## 2024-09-24 RX ORDER — SULFAMETHOXAZOLE/TRIMETHOPRIM 800-160 MG
1 TABLET ORAL EVERY 12 HOURS SCHEDULED
COMMUNITY
Start: 2024-09-17

## 2024-09-24 NOTE — PROGRESS NOTES
Established Patient        Chief Complaint:   Chief Complaint   Patient presents with    Foot Pain     Left foot pain, issues when getting up, pain at night        Xuan Baron is a 44 y.o. female    History of Present Illness:   Patient presents with nontraumatic left posterior heel discomfort.  Pain focally located to the distalmost portion of the Achilles tendon.  Patient is able to ambulate independently.  Pain is most noticeable after prolonged periods of offloading, denies any recent changes to footwear.    Denies chest pain, syncope, palpitations or vertigo.  Denies fever, chills or night sweats.  Maintains an active lifestyle, balanced dietary intake; reports good hydration habits.  Denies hematuria/dysuria.  Denies any BRB/BTS.  No new rashes.  Denies orthopnea, epistaxis or hemoptysis.    Subjective     The following portions of the patient's history were reviewed and updated as appropriate: allergies, current medications, past family history, past medical history, past social history, past surgical history and problem list.    No Known Allergies    Review of Systems  Constitutional: Negative for fever. Negative for chills, diaphoresis, fatigue and unexpected weight change.   HENT: No dysphagia; no changes to vision/hearing/smell/taste; no epistaxis  Eyes: Negative for redness and visual disturbance.   Respiratory: negative for shortness of breath. Negative for chest pain . Negative for cough and chest tightness.   Cardiovascular: Negative for chest pain and palpitations.   Gastrointestinal: Negative for abdominal distention, abdominal pain and blood in stool.   Endocrine: Negative for cold intolerance and heat intolerance.   Genitourinary: Negative for difficulty urinating, dysuria and frequency.   Musculoskeletal: Negative for arthralgias, back pain and myalgias.   Skin: Negative for color change, rash and wound.   Neurological: Negative for syncope, weakness and headaches.  "  Hematological: Negative for adenopathy. Does not bruise/bleed easily.   Psychiatric/Behavioral: Negative for confusion. The patient is not nervous/anxious.    Objective     Physical Exam   Vital Signs: /72   Pulse 66   Ht 165.1 cm (65\")   Wt 71.1 kg (156 lb 12.8 oz)   LMP  (LMP Unknown)   BMI 26.09 kg/m²   BMI is >= 25 and <30. (Overweight) The following options were offered after discussion;: exercise counseling/recommendations and nutrition counseling/recommendations  Patient's (Body mass index is 26.09 kg/m².) indicates that they are overweight (BMI 25-29.9) with health related conditions that include none . Weight is unchanged. BMI is is above average; BMI management plan is completed. We discussed portion control and increasing exercise.      General Appearance: alert, oriented x 3, no acute distress.  Skin: warm and dry.   HEENT: Atraumatic.  pupils round and reactive to light and accommodation, oral mucosa pink and moist.  Nares patent without epistaxis.  External auditory canals are patent tympanic membranes intact.  Neck: supple, no JVD, trachea midline.  No thyromegaly  Lungs: CTA, unlabored breathing effort.  Heart: RRR, normal S1 and S2, no S3, no rub.  Abdomen: soft, non-tender, no palpable bladder, present bowel sounds to auscultation ×4.  No guarding or rigidity.  Extremities: no clubbing, cyanosis or edema.  Good range of motion actively and passively.  Symmetric muscle strength and development.  Ambulates independently.  There is pain on direct palpation of the left subcutaneous Achilles bursa, mild tenderness along the distalmost insertion point to the calcaneus.  Gilbert/Vanegas sign negative.  No overlying skin changes.  Normal dorsiflexion/plantarflexion of the feet.  Neuro: normal speech and mental status.  Cranial nerves II through XII intact.  No anosmia. DTR 2+; proprioception intact.  No focal motor/sensory deficits.    Advance Care Planning   ACP discussion was held with the " patient during this visit. Patient does not have an advance directive, information provided.       Assessment and Plan      Assessment/Plan:   Diagnoses and all orders for this visit:    1. Acute foot pain, left (Primary)  -     sulindac (CLINORIL) 200 MG tablet; 1 tab po bid with meal x 14 days; then 1 tab po daily with meal x 14 days; then STOP  Dispense: 42 tablet; Refill: 0    2. Tendonitis, Achilles, left  -     sulindac (CLINORIL) 200 MG tablet; 1 tab po bid with meal x 14 days; then 1 tab po daily with meal x 14 days; then STOP  Dispense: 42 tablet; Refill: 0    3. Left calcaneal bursitis      I have recommended utilization of ice compress therapy to the affected area of the distalmost attachment point of left Achilles tendon.  This should coincide with the cutaneous/tendinosis calcaneal bursa.    I have recommended a course of sulindac, to be taken twice daily with meals for the first 14 days, then decrease to once daily with meal for the next 14 days.  I have instructed patient to notify the office should she develop any ill effects to the medication, or if her symptoms fail to improve.    I have also recommended that she utilize a heel wedge/cup, to be worn in both shoes, hopeful to reduce the active weightbearing tension to the affected area.    Updated order for needed mammography.      Discussion Summary:    Discussed plan of care in detail with pt today; pt verb understanding and agrees.    I spent 30 minutes caring for Xuan on this date of service. This time includes time spent by me in the following activities:preparing for the visit, performing a medically appropriate examination and/or evaluation , counseling and educating the patient/family/caregiver, ordering medications, tests, or procedures, documenting information in the medical record, and care coordination    I have reviewed and updated all copied forward information, as appropriate.  I attest to the accuracy and relevance of any  unchanged information.    Follow up:  Return if symptoms worsen or fail to improve.     There are no Patient Instructions on file for this visit.        Jose Ng DO  10/02/24  06:57 EDT

## 2024-10-08 ENCOUNTER — OFFICE VISIT (OUTPATIENT)
Dept: FAMILY MEDICINE CLINIC | Facility: CLINIC | Age: 44
End: 2024-10-08
Payer: COMMERCIAL

## 2024-10-08 VITALS
DIASTOLIC BLOOD PRESSURE: 70 MMHG | HEIGHT: 65 IN | SYSTOLIC BLOOD PRESSURE: 100 MMHG | OXYGEN SATURATION: 99 % | HEART RATE: 76 BPM | WEIGHT: 158 LBS | BODY MASS INDEX: 26.33 KG/M2

## 2024-10-08 DIAGNOSIS — R00.2 PALPITATIONS: ICD-10-CM

## 2024-10-08 DIAGNOSIS — R21 RASH: ICD-10-CM

## 2024-10-08 DIAGNOSIS — M25.649 JOINT STIFFNESS OF HAND, UNSPECIFIED LATERALITY: ICD-10-CM

## 2024-10-08 DIAGNOSIS — L30.9 DERMATITIS: Primary | ICD-10-CM

## 2024-10-08 DIAGNOSIS — R53.83 FATIGUE, UNSPECIFIED TYPE: ICD-10-CM

## 2024-10-08 DIAGNOSIS — J30.9 ALLERGIC RHINITIS, UNSPECIFIED SEASONALITY, UNSPECIFIED TRIGGER: ICD-10-CM

## 2024-10-08 DIAGNOSIS — Z12.31 ENCOUNTER FOR SCREENING MAMMOGRAM FOR MALIGNANT NEOPLASM OF BREAST: ICD-10-CM

## 2024-10-08 PROCEDURE — 99214 OFFICE O/P EST MOD 30 MIN: CPT | Performed by: NURSE PRACTITIONER

## 2024-10-08 NOTE — PROGRESS NOTES
"      Subjective     Chief Complaint:    Chief Complaint   Patient presents with    Rash     pT STS HAVING BREAK OUTS ON FACE. Has dealt with dermatitis for long time but thi is different,  Derm told her to come to pcp    Palpitations     Pt sts she has been having more often,  Mother  from [place],  Dad has had open heart surgery and several HA,  heart disease does run in family       History of Present Illness:   Chronic rash on her face that comes and goes, present for 2 years, follows with derm, derm feels there could be something else going on  Hair thinning  Feels like she is allergic to a lot of things  Worse heart palps, woke up gasping with heart palps the other night   Eats clean and is active       Review of Systems  Gen- No fevers, chills  CV- No chest pain, palpitations  Resp- No cough, dyspnea  GI- No N/V/D, abd pain  Neuro-No dizziness, headaches      I have reviewed and/or updated the patient's past medical, surgical, family, social history and problem list as appropriate.     Medications:    Current Outpatient Medications:     clobetasol (TEMOVATE) 0.05 % external solution, APPLY TO SCALP WEEKLY AS NEEDED AS DIRECTED, Disp: , Rfl:     metroNIDAZOLE (METROGEL) 1 % gel, APPLY TOPICALLY TO FACE EVERY NIGHT AT BEDTIME, Disp: , Rfl:     minoxidil (LONITEN) 2.5 MG tablet, Take 1 tablet by mouth Every 12 (Twelve) Hours., Disp: , Rfl:     Allergies:  No Known Allergies    Objective     Vital Signs:   Vitals:    10/08/24 0934   BP: 100/70   Pulse: 76   SpO2: 99%   Weight: 71.7 kg (158 lb)   Height: 165.1 cm (65\")     Body mass index is 26.29 kg/m².    Physical Exam:    Physical Exam  Vitals and nursing note reviewed.   Constitutional:       Appearance: She is well-developed.   HENT:      Head: Normocephalic and atraumatic.      Right Ear: Tympanic membrane, ear canal and external ear normal.      Left Ear: Tympanic membrane, ear canal and external ear normal.      Nose: Nose normal.      Mouth/Throat:      " Mouth: Mucous membranes are moist.   Eyes:      Pupils: Pupils are equal, round, and reactive to light.   Neck:      Thyroid: No thyromegaly.      Vascular: No carotid bruit.   Cardiovascular:      Rate and Rhythm: Normal rate and regular rhythm.      Heart sounds: Normal heart sounds.   Pulmonary:      Effort: Pulmonary effort is normal.      Breath sounds: Normal breath sounds.   Abdominal:      General: Bowel sounds are normal. There is no distension.      Palpations: Abdomen is soft.      Tenderness: There is no abdominal tenderness.   Musculoskeletal:      Cervical back: Neck supple.   Skin:     General: Skin is warm and dry.   Neurological:      General: No focal deficit present.      Mental Status: She is alert and oriented to person, place, and time.   Psychiatric:         Mood and Affect: Mood normal.         Behavior: Behavior normal.         Assessment / Plan     Assessment/Plan:   Problem List Items Addressed This Visit    None  Visit Diagnoses       Dermatitis    -  Primary    Relevant Orders    Allergens, Zone 5    Food Allergy Profile    Celiac Comprehensive Panel    Alpha-Gal IgE Panel    Rash        Relevant Orders    Allergens, Zone 5    Food Allergy Profile    Celiac Comprehensive Panel    Alpha-Gal IgE Panel    Allergic rhinitis, unspecified seasonality, unspecified trigger        Relevant Orders    Allergens, Zone 5    Food Allergy Profile    Fatigue, unspecified type        Relevant Orders    Comprehensive Metabolic Panel    CBC (No Diff)    TSH Rfx On Abnormal To Free T4    Magnesium    Joint stiffness of hand, unspecified laterality        Relevant Orders    Magnesium    Palpitations        Relevant Orders    Holter Monitor - 72 Hour Up To 15 Days    Adult Transthoracic Echo Complete W/ Cont if Necessary Per Protocol    Encounter for screening mammogram for malignant neoplasm of breast        Relevant Orders    Mammo Screening Digital Tomosynthesis Bilateral With CAD          -- labs  --  discussed trailing gluten free diet     Discussed plan of care in detail with pt today; pt verb understanding and agrees.    Follow up:  As needed, encouraged RTC for annual physical     Electronically signed by MAL Taylor   10/08/2024 09:51 EDT      Please note that portions of this note were completed with a voice recognition program.

## 2024-10-09 LAB — MAGNESIUM SERPL-MCNC: 2.2 MG/DL (ref 1.6–2.6)

## 2024-10-11 LAB
A ALTERNATA IGE QN: <0.1 KU/L
A FUMIGATUS IGE QN: <0.1 KU/L
ALBUMIN SERPL-MCNC: 4.1 G/DL (ref 3.9–4.9)
ALP SERPL-CCNC: 59 IU/L (ref 44–121)
ALPHA-GAL IGE QN: <0.1 KU/L
ALT SERPL-CCNC: 36 IU/L (ref 0–32)
AMER ROACH IGE QN: <0.1 KU/L
AST SERPL-CCNC: 29 IU/L (ref 0–40)
BAHIA GRASS IGE QN: <0.1 KU/L
BAYBERRY POLN IGE QN: <0.1 KU/L
BEEF IGE QN: 0.1 KU/L
BERMUDA GRASS IGE QN: <0.1 KU/L
BILIRUB SERPL-MCNC: 0.5 MG/DL (ref 0–1.2)
BOXELDER IGE QN: <0.1 KU/L
BUN SERPL-MCNC: 12 MG/DL (ref 6–24)
BUN/CREAT SERPL: 15 (ref 9–23)
C HERBARUM IGE QN: <0.1 KU/L
CALCIUM SERPL-MCNC: 9 MG/DL (ref 8.7–10.2)
CAT DANDER IGE QN: <0.1 KU/L
CHLORIDE SERPL-SCNC: 108 MMOL/L (ref 96–106)
CLAM IGE QN: <0.1 KU/L
CO2 SERPL-SCNC: 20 MMOL/L (ref 20–29)
CODFISH IGE QN: <0.1 KU/L
COMMON RAGWEED IGE QN: <0.1 KU/L
CONV CLASS DESCRIPTION: ABNORMAL
CORN IGE QN: <0.1 KU/L
COW MILK IGE QN: <0.1 KU/L
CREAT SERPL-MCNC: 0.8 MG/DL (ref 0.57–1)
D FARINAE IGE QN: <0.1 KU/L
D PTERONYSS IGE QN: 0.1 KU/L
DOG DANDER IGE QN: <0.1 KU/L
DOG FENNEL IGE QN: <0.1 KU/L
EGFRCR SERPLBLD CKD-EPI 2021: 93 ML/MIN/1.73
EGG WHITE IGE QN: <0.1 KU/L
ENDOMYSIUM IGA SER QL: NEGATIVE
ENGL PLANTAIN IGE QN: <0.1 KU/L
ERYTHROCYTE [DISTWIDTH] IN BLOOD BY AUTOMATED COUNT: 12.1 % (ref 11.7–15.4)
GLIADIN PEPTIDE IGA SER-ACNC: 3 UNITS (ref 0–19)
GLIADIN PEPTIDE IGG SER-ACNC: 3 UNITS (ref 0–19)
GLOBULIN SER CALC-MCNC: 2 G/DL (ref 1.5–4.5)
GLUCOSE SERPL-MCNC: 78 MG/DL (ref 70–99)
GOOSEFOOT IGE QN: <0.1 KU/L
GUM-TREE IGE QN: <0.1 KU/L
HCT VFR BLD AUTO: 38.5 % (ref 34–46.6)
HGB BLD-MCNC: 12.7 G/DL (ref 11.1–15.9)
IGA SERPL-MCNC: 115 MG/DL (ref 87–352)
IGE SERPL-ACNC: 13 IU/ML (ref 6–495)
ITALIAN CYPRESS IGE QN: 0.14 KU/L
JOHNSON GRASS IGE QN: <0.1 KU/L
LAMB IGE QN: <0.1 KU/L
M RACEMOSUS IGE QN: <0.1 KU/L
MCH RBC QN AUTO: 30.4 PG (ref 26.6–33)
MCHC RBC AUTO-ENTMCNC: 33 G/DL (ref 31.5–35.7)
MCV RBC AUTO: 92 FL (ref 79–97)
P NOTATUM IGE QN: <0.1 KU/L
PEANUT IGE QN: <0.1 KU/L
PEPPER TREE IGE QN: <0.1 KU/L
PER RYE GRASS IGE QN: <0.1 KU/L
PLATELET # BLD AUTO: 211 X10E3/UL (ref 150–450)
PORK IGE QN: <0.1 KU/L
POTASSIUM SERPL-SCNC: 4.4 MMOL/L (ref 3.5–5.2)
PRIVET IGE QN: <0.1 KU/L
PROT SERPL-MCNC: 6.1 G/DL (ref 6–8.5)
QUEEN PALM IGE QN: <0.1 KU/L
RBC # BLD AUTO: 4.18 X10E6/UL (ref 3.77–5.28)
S BOTRYOSUM IGE QN: <0.1 KU/L
SCALLOP IGE QN: <0.1 KU/L
SESAME SEED IGE QN: 0.12 KU/L
SHEEP SORREL IGE QN: <0.1 KU/L
SHRIMP IGE QN: <0.1 KU/L
SODIUM SERPL-SCNC: 139 MMOL/L (ref 134–144)
SOYBEAN IGE QN: <0.1 KU/L
TSH SERPL DL<=0.005 MIU/L-ACNC: 2.17 UIU/ML (ref 0.45–4.5)
TTG IGA SER-ACNC: <2 U/ML (ref 0–3)
TTG IGG SER-ACNC: 2 U/ML (ref 0–5)
VIRG LIVE OAK IGE QN: <0.1 KU/L
WALNUT IGE QN: <0.1 KU/L
WBC # BLD AUTO: 2.5 X10E3/UL (ref 3.4–10.8)
WHEAT IGE QN: <0.1 KU/L
WHITE ELM IGE QN: 0.11 KU/L

## 2024-10-18 DIAGNOSIS — D72.819 LEUKOPENIA, UNSPECIFIED TYPE: Primary | ICD-10-CM

## 2024-10-23 ENCOUNTER — HOSPITAL ENCOUNTER (OUTPATIENT)
Facility: HOSPITAL | Age: 44
Discharge: HOME | End: 2024-10-23
Admitting: FAMILY MEDICINE
Payer: COMMERCIAL

## 2024-10-23 DIAGNOSIS — Z12.31 ENCOUNTER FOR SCREENING MAMMOGRAM FOR MALIGNANT NEOPLASM OF BREAST: ICD-10-CM

## 2024-10-23 PROCEDURE — 77063 BREAST TOMOSYNTHESIS BI: CPT

## 2024-10-23 PROCEDURE — 77067 SCR MAMMO BI INCL CAD: CPT

## 2024-11-05 ENCOUNTER — OFFICE VISIT (OUTPATIENT)
Dept: FAMILY MEDICINE CLINIC | Facility: CLINIC | Age: 44
End: 2024-11-05
Payer: COMMERCIAL

## 2024-11-05 VITALS
HEART RATE: 80 BPM | DIASTOLIC BLOOD PRESSURE: 70 MMHG | SYSTOLIC BLOOD PRESSURE: 100 MMHG | WEIGHT: 158 LBS | BODY MASS INDEX: 26.33 KG/M2 | OXYGEN SATURATION: 98 % | HEIGHT: 65 IN

## 2024-11-05 DIAGNOSIS — M77.32 CALCANEAL SPUR OF LEFT FOOT: ICD-10-CM

## 2024-11-05 DIAGNOSIS — M76.62 TENDONITIS, ACHILLES, LEFT: ICD-10-CM

## 2024-11-05 DIAGNOSIS — M25.572 ARTHRALGIA OF LEFT FOOT: Primary | ICD-10-CM

## 2024-11-05 DIAGNOSIS — M72.2 PLANTAR FASCIITIS OF LEFT FOOT: ICD-10-CM

## 2024-11-05 PROCEDURE — 99214 OFFICE O/P EST MOD 30 MIN: CPT | Performed by: FAMILY MEDICINE

## 2024-11-05 RX ORDER — NABUMETONE 500 MG/1
500 TABLET, FILM COATED ORAL 2 TIMES DAILY WITH MEALS
Qty: 28 TABLET | Refills: 0 | Status: SHIPPED | OUTPATIENT
Start: 2024-11-05 | End: 2024-11-18

## 2024-11-12 ENCOUNTER — TRANSCRIBE ORDERS (OUTPATIENT)
Dept: ADMINISTRATIVE | Facility: HOSPITAL | Age: 44
End: 2024-11-12
Payer: COMMERCIAL

## 2024-11-12 ENCOUNTER — HOSPITAL ENCOUNTER (OUTPATIENT)
Facility: HOSPITAL | Age: 44
Discharge: HOME | End: 2024-11-12
Payer: COMMERCIAL

## 2024-11-12 DIAGNOSIS — R92.8 ABNORMAL MAMMOGRAM: ICD-10-CM

## 2024-11-12 DIAGNOSIS — R92.8 ABNORMAL MAMMOGRAM: Primary | ICD-10-CM

## 2024-11-12 PROCEDURE — 77065 DX MAMMO INCL CAD UNI: CPT | Performed by: RADIOLOGY

## 2024-11-12 PROCEDURE — 76642 ULTRASOUND BREAST LIMITED: CPT | Performed by: RADIOLOGY

## 2024-11-12 PROCEDURE — 77061 BREAST TOMOSYNTHESIS UNI: CPT | Performed by: RADIOLOGY

## 2024-11-12 PROCEDURE — 77065 DX MAMMO INCL CAD UNI: CPT

## 2024-11-12 PROCEDURE — G0279 TOMOSYNTHESIS, MAMMO: HCPCS

## 2024-11-12 PROCEDURE — 76642 ULTRASOUND BREAST LIMITED: CPT

## 2024-11-16 DIAGNOSIS — M72.2 PLANTAR FASCIITIS OF LEFT FOOT: ICD-10-CM

## 2024-11-16 DIAGNOSIS — M77.32 CALCANEAL SPUR OF LEFT FOOT: ICD-10-CM

## 2024-11-16 DIAGNOSIS — M25.572 ARTHRALGIA OF LEFT FOOT: ICD-10-CM

## 2024-11-18 ENCOUNTER — TELEPHONE (OUTPATIENT)
Dept: FAMILY MEDICINE CLINIC | Facility: CLINIC | Age: 44
End: 2024-11-18
Payer: COMMERCIAL

## 2024-11-18 RX ORDER — NABUMETONE 500 MG/1
TABLET, FILM COATED ORAL
Qty: 28 TABLET | Refills: 0 | Status: SHIPPED | OUTPATIENT
Start: 2024-11-18

## 2024-11-18 NOTE — TELEPHONE ENCOUNTER
CALLED AND LET PATIENT KNOW THAT CD WAS READY AT . SHE WILL TALK WITH THEM AND SEE WHAT THEY SAY.

## 2024-11-18 NOTE — TELEPHONE ENCOUNTER
PATIENT CAME BY TO  XRAY CD TO TAKE TO [facility]. SHE STATED THAT SHE CALLED AND WAS TOLD TO JUST COME IN. PETEY TECH WAS OUT TO LUNCH BUT I DID SEND HER A REQUEST. I WILL CALL PATIENT ONCE READY.

## 2024-11-19 NOTE — PROGRESS NOTES
Established Patient        Chief Complaint:   Chief Complaint   Patient presents with    acute foot pain     Pain getting worse and radiating up her calf; spasms at night.         Xuan Baron is a 44 y.o. female    History of Present Illness:   Answers submitted by the patient for this visit:  Other (Submitted on 11/5/2024)  Please describe your symptoms.: Intense spasms and pain in foot caused by Achilles Tendinitis  Have you had these symptoms before?: Yes  How long have you been having these symptoms?: Greater than 2 weeks  Primary Reason for Visit (Submitted on 11/5/2024)  What is the primary reason for your visit?: Problem Not Listed    Here today in scheduled follow-up visit of arthralgia of the left foot.  Affects most aspects of ADLs, particularly worsened after immobilization and weightbearing status, such as rising from sleeping in the morning.  No new trauma or injury.    Denies fever, chills or night sweats, no chest pain, shortness of breath, palpitations or vertigo.  Tolerating all nutritional intake.  Good urine output, no hematuria/dysuria.    Subjective     The following portions of the patient's history were reviewed and updated as appropriate: allergies, current medications, past family history, past medical history, past social history, past surgical history and problem list.    No Known Allergies    Review of Systems  Constitutional: Negative for fever. Negative for chills, diaphoresis, fatigue and unexpected weight change.   HENT: No dysphagia; no changes to vision/hearing/smell/taste; no epistaxis  Eyes: Negative for redness and visual disturbance.   Respiratory: negative for shortness of breath. Negative for chest pain . Negative for cough and chest tightness.   Cardiovascular: Negative for chest pain and palpitations.   Gastrointestinal: Negative for abdominal distention, abdominal pain and blood in stool.   Endocrine: Negative for cold intolerance and heat  "intolerance.   Genitourinary: Negative for difficulty urinating, dysuria and frequency.   Musculoskeletal: Left foot arthralgia as per above.  Skin: Negative for color change, rash and wound.   Neurological: Negative for syncope, weakness and headaches.   Hematological: Negative for adenopathy. Does not bruise/bleed easily.   Psychiatric/Behavioral: Negative for confusion. The patient is not nervous/anxious.    Objective     Physical Exam   Vital Signs: /70   Pulse 80   Ht 165.1 cm (65\")   Wt 71.7 kg (158 lb)   LMP  (LMP Unknown)   SpO2 98%   BMI 26.29 kg/m²   BMI is >= 25 and <30. (Overweight) The following options were offered after discussion;: exercise counseling/recommendations and nutrition counseling/recommendations  Patient's (Body mass index is 26.29 kg/m².) indicates that they are overweight (BMI 25-29.9) with health related conditions that include none . Weight is unchanged. BMI is is above average; BMI management plan is completed. We discussed portion control and increasing exercise.      General Appearance: alert, oriented x 3, no acute distress.  Skin: warm and dry.   HEENT: Atraumatic.  pupils round and reactive to light and accommodation, oral mucosa pink and moist.  Nares patent without epistaxis.  External auditory canals are patent tympanic membranes intact.  Neck: supple, no JVD, trachea midline.  No thyromegaly  Lungs: CTA, unlabored breathing effort.  Heart: RRR, normal S1 and S2, no S3, no rub.  Abdomen: soft, non-tender, no palpable bladder, present bowel sounds to auscultation ×4.  No guarding or rigidity.  Extremities: no clubbing, cyanosis or edema.  Good range of motion actively and passively.  Symmetric muscle strength and development.  Ambulates independently.  There is pain on direct palpation of the left subcutaneous Achilles bursa, mild tenderness along the distalmost insertion point to the calcaneus, and along the plantar fascia.  Gilbert/Vanegas sign negative.  No " overlying skin changes.  Normal dorsiflexion/plantarflexion of the feet.  Neuro: normal speech and mental status.  Cranial nerves II through XII intact.  No anosmia. DTR 2+; proprioception intact.  No focal motor/sensory deficits.    Advance Care Planning   ACP discussion was held with the patient during this visit. Patient does not have an advance directive, information provided.       Assessment and Plan      Assessment/Plan:   Diagnoses and all orders for this visit:    1. Arthralgia of left foot (Primary)  -     XR foot 3+ vw left  -     Discontinue: nabumetone (RELAFEN) 500 MG tablet; Take 1 tablet by mouth 2 (Two) Times a Day With Meals for 14 days.  Dispense: 28 tablet; Refill: 0  -     Ambulatory Referral to Podiatry    2. Tendonitis, Achilles, left  -     XR foot 3+ vw left    3. Calcaneal spur of left foot  -     Discontinue: nabumetone (RELAFEN) 500 MG tablet; Take 1 tablet by mouth 2 (Two) Times a Day With Meals for 14 days.  Dispense: 28 tablet; Refill: 0  -     Ambulatory Referral to Podiatry    4. Plantar fasciitis of left foot  -     Discontinue: nabumetone (RELAFEN) 500 MG tablet; Take 1 tablet by mouth 2 (Two) Times a Day With Meals for 14 days.  Dispense: 28 tablet; Refill: 0  -     Ambulatory Referral to Podiatry    Begin use of nabumetone, to be taken twice daily for 14 days.  I have advised patient to take medication with food.  She is to notify the office should she develop any ill effects to the medication.  I have also placed a referral to be seen by podiatry, as x-ray of the left foot demonstrates a large calcaneal spur, most certainly creating the discomfort to the plantar fascia at that inspects all activities of daily living as it relates to weightbearing status.    Vital signs demonstrate hemodynamic stability.      Discussion Summary:    Discussed plan of care in detail with pt today; pt verb understanding and agrees.    I spent 35 minutes caring for Xuan on this date of service.  This time includes time spent by me in the following activities:preparing for the visit, performing a medically appropriate examination and/or evaluation , counseling and educating the patient/family/caregiver, ordering medications, tests, or procedures, referring and communicating with other health care professionals , documenting information in the medical record, independently interpreting results and communicating that information with the patient/family/caregiver, and care coordination    I confirm accuracy of unchanged data/findings which have been carried forward from previous visit, as well as I have updated appropriately those that have changed.      Follow up:  No follow-ups on file.     There are no Patient Instructions on file for this visit.        Jose Ng,   11/18/24  21:56 EST

## 2024-12-01 DIAGNOSIS — M72.2 PLANTAR FASCIITIS OF LEFT FOOT: ICD-10-CM

## 2024-12-01 DIAGNOSIS — M25.572 ARTHRALGIA OF LEFT FOOT: ICD-10-CM

## 2024-12-01 DIAGNOSIS — M77.32 CALCANEAL SPUR OF LEFT FOOT: ICD-10-CM

## 2024-12-03 ENCOUNTER — HOSPITAL ENCOUNTER (OUTPATIENT)
Facility: HOSPITAL | Age: 44
Discharge: HOME | End: 2024-12-03
Payer: COMMERCIAL

## 2024-12-03 DIAGNOSIS — R92.8 ABNORMAL MAMMOGRAM: ICD-10-CM

## 2024-12-03 PROCEDURE — 25010000002 LIDOCAINE 1% - EPINEPHRINE 1:100000 1 %-1:100000 SOLUTION: Performed by: FAMILY MEDICINE

## 2024-12-03 PROCEDURE — A4648 IMPLANTABLE TISSUE MARKER: HCPCS

## 2024-12-03 PROCEDURE — 88305 TISSUE EXAM BY PATHOLOGIST: CPT | Performed by: FAMILY MEDICINE

## 2024-12-03 PROCEDURE — 25010000002 LIDOCAINE 1 % SOLUTION: Performed by: FAMILY MEDICINE

## 2024-12-03 RX ORDER — NABUMETONE 500 MG/1
TABLET, FILM COATED ORAL
Qty: 28 TABLET | Refills: 0 | Status: SHIPPED | OUTPATIENT
Start: 2024-12-03

## 2024-12-03 RX ORDER — LIDOCAINE HYDROCHLORIDE 10 MG/ML
5 INJECTION, SOLUTION INFILTRATION; PERINEURAL ONCE
Status: COMPLETED | OUTPATIENT
Start: 2024-12-03 | End: 2024-12-03

## 2024-12-03 RX ORDER — LIDOCAINE HYDROCHLORIDE AND EPINEPHRINE 10; 10 MG/ML; UG/ML
10 INJECTION, SOLUTION INFILTRATION; PERINEURAL ONCE
Status: COMPLETED | OUTPATIENT
Start: 2024-12-03 | End: 2024-12-03

## 2024-12-03 RX ADMIN — LIDOCAINE HYDROCHLORIDE 3 ML: 10 INJECTION, SOLUTION INFILTRATION; PERINEURAL at 10:06

## 2024-12-03 RX ADMIN — LIDOCAINE HYDROCHLORIDE,EPINEPHRINE BITARTRATE 10 ML: 10; .01 INJECTION, SOLUTION INFILTRATION; PERINEURAL at 10:07

## 2024-12-03 NOTE — PROGRESS NOTES
Alert and oriented. Denies discomfort, no active bleeding, steri-strips not visualized, gauze dressing intact. Cold pack applied to breast over bandage. Cold packs given. Questions answered, support given. Verbalizes and demonstrates understanding of post-care instructions, written copy given.

## 2024-12-05 ENCOUNTER — TELEPHONE (OUTPATIENT)
Facility: HOSPITAL | Age: 44
End: 2024-12-05
Payer: COMMERCIAL

## 2024-12-05 LAB
CYTO UR: NORMAL
LAB AP CASE REPORT: NORMAL
LAB AP CLINICAL INFORMATION: NORMAL
LAB AP DIAGNOSIS COMMENT: NORMAL
PATH REPORT.FINAL DX SPEC: NORMAL
PATH REPORT.GROSS SPEC: NORMAL

## 2024-12-05 NOTE — TELEPHONE ENCOUNTER
Patient notified of pathology results and recommendation. Verbalizes understanding. States having some discomfort, denies needing analgesics.  Denies signs and symptoms of infection. Questions answered, support given, verbalized understanding. Patient transferred to [facility] scheduling per request to schedule recommended follow-up.

## 2024-12-14 DIAGNOSIS — M25.572 ARTHRALGIA OF LEFT FOOT: ICD-10-CM

## 2024-12-14 DIAGNOSIS — M77.32 CALCANEAL SPUR OF LEFT FOOT: ICD-10-CM

## 2024-12-14 DIAGNOSIS — M72.2 PLANTAR FASCIITIS OF LEFT FOOT: ICD-10-CM

## 2024-12-16 RX ORDER — NABUMETONE 500 MG/1
TABLET, FILM COATED ORAL
Qty: 28 TABLET | Refills: 0 | Status: SHIPPED | OUTPATIENT
Start: 2024-12-16

## 2024-12-16 NOTE — TELEPHONE ENCOUNTER
Rx Refill Note  Requested Prescriptions     Pending Prescriptions Disp Refills    nabumetone (RELAFEN) 500 MG tablet [Pharmacy Med Name: NABUMETONE 500MG TABLETS] 28 tablet 0     Sig: TAKE 1 TABLET BY MOUTH TWICE DAILY WITH MEALS FOR 14 DAYS      Last office visit with prescribing clinician: 11/5/2024   Last telemedicine visit with prescribing clinician: Visit date not found   Next office visit with prescribing clinician: Visit date not found                         Would you like a call back once the refill request has been completed: [] Yes [] No    If the office needs to give you a call back, can they leave a voicemail: [] Yes [] No    Minerva Pavon MA  12/16/24, 09:36 EST

## 2025-02-25 ENCOUNTER — OFFICE VISIT (OUTPATIENT)
Dept: FAMILY MEDICINE CLINIC | Facility: CLINIC | Age: 45
End: 2025-02-25
Payer: COMMERCIAL

## 2025-02-25 VITALS
WEIGHT: 156.6 LBS | DIASTOLIC BLOOD PRESSURE: 72 MMHG | TEMPERATURE: 98.1 F | SYSTOLIC BLOOD PRESSURE: 124 MMHG | BODY MASS INDEX: 26.09 KG/M2 | HEART RATE: 81 BPM | RESPIRATION RATE: 16 BRPM | OXYGEN SATURATION: 98 % | HEIGHT: 65 IN

## 2025-02-25 DIAGNOSIS — J30.2 SEASONAL ALLERGIES: ICD-10-CM

## 2025-02-25 DIAGNOSIS — B96.89 ACUTE BACTERIAL RHINOSINUSITIS: Primary | ICD-10-CM

## 2025-02-25 DIAGNOSIS — R05.8 UPPER AIRWAY COUGH SYNDROME: ICD-10-CM

## 2025-02-25 DIAGNOSIS — J01.90 ACUTE BACTERIAL RHINOSINUSITIS: Primary | ICD-10-CM

## 2025-02-25 PROCEDURE — 99213 OFFICE O/P EST LOW 20 MIN: CPT | Performed by: STUDENT IN AN ORGANIZED HEALTH CARE EDUCATION/TRAINING PROGRAM

## 2025-02-25 PROCEDURE — 96372 THER/PROPH/DIAG INJ SC/IM: CPT | Performed by: STUDENT IN AN ORGANIZED HEALTH CARE EDUCATION/TRAINING PROGRAM

## 2025-02-25 RX ORDER — FLUTICASONE PROPIONATE 50 MCG
2 SPRAY, SUSPENSION (ML) NASAL DAILY
Qty: 16 G | Refills: 2 | Status: SHIPPED | OUTPATIENT
Start: 2025-02-25

## 2025-02-25 RX ORDER — PREDNISONE 20 MG/1
20 TABLET ORAL DAILY
Qty: 5 TABLET | Refills: 0 | Status: SHIPPED | OUTPATIENT
Start: 2025-02-25 | End: 2025-03-02

## 2025-02-25 RX ORDER — BROMPHENIRAMINE MALEATE, PSEUDOEPHEDRINE HYDROCHLORIDE, AND DEXTROMETHORPHAN HYDROBROMIDE 2; 30; 10 MG/5ML; MG/5ML; MG/5ML
5 SYRUP ORAL 4 TIMES DAILY PRN
Qty: 473 ML | Refills: 0 | Status: SHIPPED | OUTPATIENT
Start: 2025-02-25

## 2025-02-25 RX ORDER — METHYLPREDNISOLONE ACETATE 40 MG/ML
40 INJECTION, SUSPENSION INTRA-ARTICULAR; INTRALESIONAL; INTRAMUSCULAR; SOFT TISSUE ONCE
Status: COMPLETED | OUTPATIENT
Start: 2025-02-25 | End: 2025-02-25

## 2025-02-25 RX ORDER — TERBINAFINE HYDROCHLORIDE 250 MG/1
1 TABLET ORAL DAILY
COMMUNITY
Start: 2024-12-31

## 2025-02-25 RX ORDER — CEFTRIAXONE 1 G/1
1 INJECTION, POWDER, FOR SOLUTION INTRAMUSCULAR; INTRAVENOUS ONCE
Status: COMPLETED | OUTPATIENT
Start: 2025-02-25 | End: 2025-02-25

## 2025-02-25 RX ADMIN — METHYLPREDNISOLONE ACETATE 40 MG: 40 INJECTION, SUSPENSION INTRA-ARTICULAR; INTRALESIONAL; INTRAMUSCULAR; SOFT TISSUE at 13:52

## 2025-02-25 RX ADMIN — CEFTRIAXONE 1 G: 1 INJECTION, POWDER, FOR SOLUTION INTRAMUSCULAR; INTRAVENOUS at 13:51

## 2025-02-25 NOTE — PROGRESS NOTES
Same Day Office Visit      Date: 2025   Patient Name: Xuan Baron  : 1980   MRN: [id number]     Chief Complaint:    Chief Complaint   Patient presents with    [facility]    Cough     [facility] visit 1 week ago, steroid given w/ no relief    Earache     Right ear pain, popping    Sinus Problem     Pressure and drainage        History of Present Illness: Xuan Baron is a 44 y.o. female who is here today for upper respiratory symptoms.    Patient reports that she had recent UTI last week and was outside [facility] and was provided antibiotic. Patient reports that she concurrently had upper respiratory symptoms at that time but was negative for COVID/Flu. Patient reports that she was positive for Flu roughly one month ago. Patient reports that she was provided Macrobid and steroid with resolution of UTI symptoms though persistence of URI symptoms.    Patient reports that she has lost her voice since Saturday. Patient reports she continues to have cough with green/yellow phlegm production. Patient reports that she does have a pressure sensation in her frontal sinuses without pain. Patient reports that she has felt pressure in her right ear as well though reports school nurse has looked at this and said it was normal.     Subjective     I have reviewed the patients family history, social history, past medical history, past surgical history and have updated it as appropriate.     Medications:     Current Outpatient Medications:     minoxidil (LONITEN) 2.5 MG tablet, Take 1 tablet by mouth Every 12 (Twelve) Hours., Disp: , Rfl:     terbinafine (lamiSIL) 250 MG tablet, Take 1 tablet by mouth Daily., Disp: , Rfl:     amoxicillin-clavulanate (AUGMENTIN) 875-125 MG per tablet, Take 1 tablet by mouth 2 (Two) Times a Day for 7 days., Disp: 14 tablet, Rfl: 0    brompheniramine-pseudoephedrine-DM 30-2-10 MG/5ML syrup, Take 5 mL by mouth 4 (Four) Times a Day As Needed for Allergies., Disp: 473 mL, Rfl: 0     "clobetasol (TEMOVATE) 0.05 % external solution, APPLY TO SCALP WEEKLY AS NEEDED AS DIRECTED, Disp: , Rfl:     fluticasone (FLONASE) 50 MCG/ACT nasal spray, Administer 2 sprays into the nostril(s) as directed by provider Daily., Disp: 16 g, Rfl: 2    metroNIDAZOLE (METROGEL) 1 % gel, APPLY TOPICALLY TO FACE EVERY NIGHT AT BEDTIME (Patient not taking: Reported on 11/5/2024), Disp: , Rfl:     nabumetone (RELAFEN) 500 MG tablet, TAKE 1 TABLET BY MOUTH TWICE DAILY WITH MEALS FOR 14 DAYS, Disp: 28 tablet, Rfl: 0    predniSONE (DELTASONE) 20 MG tablet, Take 1 tablet by mouth Daily for 5 days., Disp: 5 tablet, Rfl: 0    Current Facility-Administered Medications:     cefTRIAXone (ROCEPHIN) injection 1 g, 1 g, Intramuscular, Once, Don Cano MD    methylPREDNISolone acetate (DEPO-medrol) injection 40 mg, 40 mg, Intramuscular, Once, oDn Cano MD    Allergies:   No Known Allergies    Objective     Physical Exam:     Vital Signs:   Vitals:    02/25/25 1317   BP: 124/72   Pulse: 81   Resp: 16   Temp: 98.1 °F (36.7 °C)   TempSrc: Oral   SpO2: 98%   Weight: 71 kg (156 lb 9.6 oz)   Height: 165.1 cm (65\")     Body mass index is 26.06 kg/m².          Physical Exam     General:  Non-toxic appearing adult female in no acute distress. Alert and oriented. Vitals reviewed and are within normal limits.  Head/ENT: Atraumatic. Facial/sinus fullness/pressure to palpation. Tympanic membranes are normal bilaterally with normal appearing auditory canals. Oral cavity shows post nasal drip though no evidence of edema or exudate.  Neck: Anatomy appears symmetrical.  Cardiac: Regular rate and rhythm to auscultation.   Pulmonary: Lungs are clear to auscultation bilaterally.  Integumentary/Skin: Skin appears unremarkable from observable skin surfaces.   Neurological: Normal gait and speech.  Behavioral/Psych: Patient behavior/demeanor appears consistent with reported age. Patient is pleasant with normal affect today.  "     Procedures        Assessment / Plan      1. Acute bacterial rhinosinusitis  - cefTRIAXone (ROCEPHIN) injection 1 g  - amoxicillin-clavulanate (AUGMENTIN) 875-125 MG per tablet; Take 1 tablet by mouth 2 (Two) Times a Day for 7 days.  Dispense: 14 tablet; Refill: 0  - methylPREDNISolone acetate (DEPO-medrol) injection 40 mg  - predniSONE (DELTASONE) 20 MG tablet; Take 1 tablet by mouth Daily for 5 days.  Dispense: 5 tablet; Refill: 0    2. Upper airway cough syndrome  - brompheniramine-pseudoephedrine-DM 30-2-10 MG/5ML syrup; Take 5 mL by mouth 4 (Four) Times a Day As Needed for Allergies.  Dispense: 473 mL; Refill: 0    3. Seasonal allergies  - fluticasone (FLONASE) 50 MCG/ACT nasal spray; Administer 2 sprays into the nostril(s) as directed by provider Daily.  Dispense: 16 g; Refill: 2     Assessment & Plan  1. Bacterial rhinosinusitis  - Symptoms: frontal sinus pressure, phlegm/mucus production likely secondary to bacterial rhinosinusitis especially given recent symptoms consistent with viral upper respiratory infection.  - Ceftriaxone to be administered in the office followed by a course of Augmentin  - Steroid injection to be provided followed by prednisone burst    2. Upper airway cough syndrome  - Persistent cough following recent URI symptoms. Discussed transient hypersensitivity of the airway contributing to cough post-viral infection (typically lasting 2 to 3 weeks, potentially up to 8 weeks)  - Bromfed prescribed for symptomatic relief  - To be reassessed at follow-up visit    3. Seasonal allergies/postnasal drip  - Flonase provided for daily use    Follow-up  - Patient to follow up in 1 month to establish regular primary care       Patient or patient representative verbalized consent for the use of Ambient Listening during the visit with  Don Cano MD for chart documentation. 2/25/2025  13:24 EST     Follow Up:   Return in about 1 month (around 3/25/2025).      Don Cano MD  [facility]   [facility]  [address] [place] [address]  Fax [phone]  Phone [phone]

## 2025-04-11 ENCOUNTER — OFFICE VISIT (OUTPATIENT)
Dept: FAMILY MEDICINE CLINIC | Facility: CLINIC | Age: 45
End: 2025-04-11
Payer: COMMERCIAL

## 2025-04-11 VITALS
WEIGHT: 151.4 LBS | BODY MASS INDEX: 35.04 KG/M2 | HEIGHT: 55 IN | OXYGEN SATURATION: 97 % | SYSTOLIC BLOOD PRESSURE: 112 MMHG | HEART RATE: 74 BPM | RESPIRATION RATE: 18 BRPM | DIASTOLIC BLOOD PRESSURE: 86 MMHG

## 2025-04-11 DIAGNOSIS — Z13.228 SCREENING FOR METABOLIC DISORDER: ICD-10-CM

## 2025-04-11 DIAGNOSIS — Z00.00 ANNUAL PHYSICAL EXAM: ICD-10-CM

## 2025-04-11 DIAGNOSIS — F41.9 ANXIETY: Primary | ICD-10-CM

## 2025-04-11 DIAGNOSIS — M76.62 ACHILLES TENDINITIS OF LEFT LOWER EXTREMITY: ICD-10-CM

## 2025-04-11 DIAGNOSIS — Z13.220 SCREENING FOR LIPID DISORDERS: ICD-10-CM

## 2025-04-11 DIAGNOSIS — Z13.0 SCREENING FOR DEFICIENCY ANEMIA: ICD-10-CM

## 2025-04-11 DIAGNOSIS — Z76.89 ENCOUNTER TO ESTABLISH CARE WITH NEW DOCTOR: ICD-10-CM

## 2025-04-11 DIAGNOSIS — Z13.29 SCREENING FOR THYROID DISORDER: ICD-10-CM

## 2025-04-11 DIAGNOSIS — R92.8 ABNORMAL MAMMOGRAM: ICD-10-CM

## 2025-04-11 DIAGNOSIS — L65.9 HAIR LOSS: ICD-10-CM

## 2025-04-11 DIAGNOSIS — Z13.1 SCREENING FOR DIABETES MELLITUS: ICD-10-CM

## 2025-04-11 DIAGNOSIS — J30.2 SEASONAL ALLERGIES: ICD-10-CM

## 2025-04-11 DIAGNOSIS — B35.1 ONYCHOMYCOSIS: ICD-10-CM

## 2025-04-11 DIAGNOSIS — L71.9 ROSACEA: ICD-10-CM

## 2025-04-11 RX ORDER — SERTRALINE HYDROCHLORIDE 25 MG/1
25 TABLET, FILM COATED ORAL DAILY
Qty: 30 TABLET | Refills: 1 | Status: SHIPPED | OUTPATIENT
Start: 2025-04-11

## 2025-04-11 RX ORDER — DOXYCYCLINE 100 MG/1
CAPSULE ORAL
COMMUNITY
Start: 2025-03-20

## 2025-04-11 RX ORDER — HYDROXYZINE HYDROCHLORIDE 25 MG/1
25 TABLET, FILM COATED ORAL EVERY 4 HOURS PRN
Qty: 90 TABLET | Refills: 1 | Status: SHIPPED | OUTPATIENT
Start: 2025-04-11

## 2025-04-11 NOTE — PROGRESS NOTES
Follow Up Office Visit      Date: 2025   Patient Name: Xuan Baron  : 1980   MRN: [id number]     Chief Complaint:    Chief Complaint   Patient presents with    [facility]     Transfer of care.     Anxiety       History of Present Illness: Xuan Baron is a 44 y.o. female who is here today to establish with new family physician for regular preventative care.    Patient reports that she has been doing okay.    Patient reports that she has had anxiety for a long time. Patient reports that she has tried several different medications over the years as well as therapy. 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    Patient reports that she did have a mammogram in 2024 that did require a biopsy and is being monitored every 6 months. Patient reports that she does follow with other provider for this.    Patient reports that she is seeing podiatrist with [facility] in [place], [place] for Achilles tendinitis in her left foot. Patient is on terbinafine for fungal toe infection.    Patient does follow with dermatology for rosacea and atopic dermatitis. Patient reports that she is on doxycycline for this. Patient reports that she is on minoxidil for hair loss.    Patient is on Flonase for allergies.    Subjective     I have reviewed the patients family history, social history, past medical history, past surgical history and have updated it as appropriate.     Medications:     Current Outpatient Medications:     clobetasol (TEMOVATE) 0.05 % external solution, APPLY TO SCALP WEEKLY AS NEEDED AS DIRECTED, Disp: , Rfl:     doxycycline (VIBRAMYCIN) 100 MG capsule, , Disp: , Rfl:     fluticasone (FLONASE) 50 MCG/ACT nasal spray, Administer 2 sprays into the nostril(s) as directed by provider Daily., Disp: 16 g, Rfl: 2    metroNIDAZOLE (METROGEL) 1 % gel, , Disp: , Rfl:      "minoxidil (LONITEN) 2.5 MG tablet, Take 1 tablet by mouth Every 12 (Twelve) Hours., Disp: , Rfl:     terbinafine (lamiSIL) 250 MG tablet, Take 1 tablet by mouth Daily., Disp: , Rfl:     hydrOXYzine (ATARAX) 25 MG tablet, Take 1 tablet by mouth Every 4 (Four) Hours As Needed for Itching., Disp: 90 tablet, Rfl: 1    sertraline (Zoloft) 25 MG tablet, Take 1 tablet by mouth Daily., Disp: 30 tablet, Rfl: 1    Allergies:   No Known Allergies    Objective     Physical Exam:     Vital Signs:   Vitals:    04/11/25 1647   BP: 112/86   Pulse: 74   Resp: 18   SpO2: 97%   Weight: 68.7 kg (151 lb 6.4 oz)   Height: 65 cm (25.59\")     Body mass index is 162.54 kg/m².          Physical Exam     General:  Well appearing adult female in no acute distress. Alert and oriented. Vitals reviewed and are within normal limits.  Head/ENT: Atraumatic. No facial/sinus tenderness to palpation. Tympanic membranes are normal bilaterally with normal appearing auditory canals. Oral cavity unremarkable for acute findings.  Neck: Anatomy appears symmetrical. There is no palpable lymphadenopathy to palpation.  Cardiac: Regular rate and rhythm to auscultation. I detect no obvious murmurs or additional heart sounds during exam.  Pulmonary: Lungs are clear to auscultation bilaterally.  Abdomen: Normal appearing abdomen. Normal bowel sounds to auscultation. Non-tender to palpation of upper and lower abdominal quadrants bilaterally.  Extremities: Upper extremities demonstrate preserved range of motion. There appears to be no evidence of edema bilaterally. Lower extremities demonstrate preserved range of motion - both passive and active. There is no appreciated lower extremity edema bilaterally to palpation.  Integumentary/Skin: Skin appears unremarkable from observable skin surfaces.   Neurological: Cranial nerves appear grossly intact. Cerebellar function appears preserved. Motor function preserved in bilateral upper and lower extremities. Sensation " preserved in face/head and bilateral upper and lower extremities. Normal gait and speech.  Behavioral/Psych: Patient behavior/demeanor appears consistent with reported age. Patient is pleasant with normal affect today.      Procedures      Assessment / Plan      1. Encounter to establish care with new doctor    2. Abnormal mammogram    3. Anxiety  - sertraline (Zoloft) 25 MG tablet; Take 1 tablet by mouth Daily.  Dispense: 30 tablet; Refill: 1  - hydrOXYzine (ATARAX) 25 MG tablet; Take 1 tablet by mouth Every 4 (Four) Hours As Needed for Itching.  Dispense: 90 tablet; Refill: 1    4. Rosacea    5. Seasonal allergies    6. Achilles tendinitis of left lower extremity    7. Onychomycosis    8. Hair loss    9. Annual physical exam    10. Screening for deficiency anemia  - CBC w AUTO Differential; Future    11. Screening for metabolic disorder  - Comprehensive metabolic panel; Future    12. Screening for lipid disorders  - Lipid panel; Future    13. Screening for thyroid disorder  - TSH Rfx On Abnormal To Free T4; Future    14. Screening for diabetes mellitus  - Hemoglobin A1c; Future     Assessment & Plan  1. Anxiety  - Significant component related to social and family issues  - Initiate Sertraline, adjust dosage in the next month if needed  - Will provide hydroxyzine as needed    2. Abnormal mammogram  - Recently had an abnormal mammogram with subsequent biopsy  - Monitored every 6 months, though no order seen  - Encourage patient to inform if no appointment is scheduled, so it can be arranged at the next visit or before    3. Achilles tendinitis  - [facility] up with [facility] in [place]  - Encourage continued follow-up    4. Onychomycosis  - Currently on terbinafine  - Encourage continued follow-up with provider  - Obtain CMP today to assess liver function    5. Rosacea  - Follows up with Dermatology  - Currently on doxycycline  - Encourage continued follow-up with Dermatology    6. Hair loss  - Using  minoxidil  - Follows up with Dermatology  - Encourage continued follow-up with Dermatology    7. Seasonal allergies  - Uses Flonase  - Adjust as needed    8. Health maintenance  - Physical examination in the office today is unremarkable  - Obtain screening/surveillance labs including CBC, CMP, TSH, lipid panel, and A1c  - Need colonoscopy screening in the next few months       Patient or patient representative verbalized consent for the use of Ambient Listening during the visit with  Don Cano MD for chart documentation. 4/11/2025  17:45 EDT     Follow Up:   Return in about 2 months (around 6/17/2025).      Don Cano MD  [facility]  [address] [place] [address] [address] [place] [address]  Fax [phone]  Phone [phone]

## 2025-05-06 ENCOUNTER — OFFICE VISIT (OUTPATIENT)
Dept: FAMILY MEDICINE CLINIC | Facility: CLINIC | Age: 45
End: 2025-05-06
Payer: COMMERCIAL

## 2025-05-06 VITALS
DIASTOLIC BLOOD PRESSURE: 74 MMHG | SYSTOLIC BLOOD PRESSURE: 120 MMHG | TEMPERATURE: 98.2 F | RESPIRATION RATE: 16 BRPM | OXYGEN SATURATION: 98 % | BODY MASS INDEX: 35.59 KG/M2 | WEIGHT: 153.8 LBS | HEIGHT: 55 IN | HEART RATE: 83 BPM

## 2025-05-06 DIAGNOSIS — R30.0 DYSURIA: Primary | ICD-10-CM

## 2025-05-06 DIAGNOSIS — K59.00 CONSTIPATION, UNSPECIFIED CONSTIPATION TYPE: ICD-10-CM

## 2025-05-06 DIAGNOSIS — N39.0 RECURRENT UTI: ICD-10-CM

## 2025-05-06 LAB
BILIRUB BLD-MCNC: NEGATIVE MG/DL
CLARITY, POC: CLEAR
COLOR UR: ABNORMAL
EXPIRATION DATE: ABNORMAL
GLUCOSE UR STRIP-MCNC: NEGATIVE MG/DL
KETONES UR QL: NEGATIVE
LEUKOCYTE EST, POC: ABNORMAL
Lab: ABNORMAL
NITRITE UR-MCNC: POSITIVE MG/ML
PH UR: 6 [PH] (ref 5–8)
PROT UR STRIP-MCNC: NEGATIVE MG/DL
RBC # UR STRIP: NEGATIVE /UL
SP GR UR: 1.01 (ref 1–1.03)
UROBILINOGEN UR QL: ABNORMAL

## 2025-05-06 PROCEDURE — 81003 URINALYSIS AUTO W/O SCOPE: CPT | Performed by: FAMILY MEDICINE

## 2025-05-06 PROCEDURE — 99214 OFFICE O/P EST MOD 30 MIN: CPT | Performed by: FAMILY MEDICINE

## 2025-05-06 RX ORDER — NITROFURANTOIN 25; 75 MG/1; MG/1
100 CAPSULE ORAL 2 TIMES DAILY
Qty: 10 CAPSULE | Refills: 0 | Status: SHIPPED | OUTPATIENT
Start: 2025-05-06

## 2025-05-06 RX ORDER — PHENAZOPYRIDINE HYDROCHLORIDE 200 MG/1
200 TABLET, FILM COATED ORAL 3 TIMES DAILY PRN
Qty: 30 TABLET | Refills: 0 | Status: SHIPPED | OUTPATIENT
Start: 2025-05-06

## 2025-05-06 RX ORDER — TRIAMCINOLONE ACETONIDE 1 MG/G
1 CREAM TOPICAL AS NEEDED
COMMUNITY
Start: 2025-03-20

## 2025-05-07 NOTE — PROGRESS NOTES
"    Office Note     Name: Xuan Baron  : 1980   MRN: [id number]     Chief Complaint:  Urinary Tract Infection (Pt has had burning and frequency with urination since . Pt started AZO yesterday, she has had several UTI's in past. )    Subjective     History of Present Illness:  Xuan Baron is a 44 y.o. female who presents today for dysuira. Ongoing for the last 2 days. Has started AZO which has helped. No flank pain or gross hematuria.  No diarrhea but has been constipated lately.  Patient has history of recurrent UTIs and this is similar to symptoms that she experienced previously.  No antibiotic allergies.  No history of MDR organisms in culture.  Recently finished course of doxycycline for steroid-induced acne.    I have reviewed the following portions of the patient's history and these were updated and discussed with the patient as appropriate: past family history, past medical history, past social history, past surgical history, and problem list.     Objective     Vital Signs  /74   Pulse 83   Temp 98.2 °F (36.8 °C) (Oral)   Resp 16   Ht 65 cm (25.59\")   Wt 69.8 kg (153 lb 12.8 oz)   SpO2 98%   .13 kg/m²   Estimated body mass index is 165.13 kg/m² as calculated from the following:    Height as of this encounter: 65 cm (25.59\").    Weight as of this encounter: 69.8 kg (153 lb 12.8 oz).    Physical Exam  Vitals reviewed.   Constitutional:       General: She is not in acute distress.     Appearance: Normal appearance. She is not ill-appearing.   HENT:      Head: Normocephalic.   Eyes:      Extraocular Movements: Extraocular movements intact.   Cardiovascular:      Rate and Rhythm: Normal rate.   Pulmonary:      Effort: Pulmonary effort is normal.      Breath sounds: Normal breath sounds.   Abdominal:      General: There is no distension.      Palpations: Abdomen is soft. There is no mass.      Tenderness: There is no abdominal tenderness. There is no right CVA " tenderness, left CVA tenderness, guarding or rebound.      Hernia: No hernia is present.   Neurological:      Mental Status: She is alert and oriented to person, place, and time.   Psychiatric:         Mood and Affect: Mood normal.         Behavior: Behavior normal.         Thought Content: Thought content normal.         Judgment: Judgment normal.            Assessment and Plan     Diagnoses and all orders for this visit:    1. Dysuria (Primary)  -     POCT urinalysis dipstick, automated  -     Urine Culture - Urine, Urine, Clean Catch  -     nitrofurantoin, macrocrystal-monohydrate, (Macrobid) 100 MG capsule; Take 1 capsule by mouth 2 (Two) Times a Day.  Dispense: 10 capsule; Refill: 0    2. Recurrent UTI  -     phenazopyridine (Pyridium) 200 MG tablet; Take 1 tablet by mouth 3 (Three) Times a Day As Needed for Bladder Spasms or Dysuria. DO NOT TAKE FOR MORE THAN 2 DAYS AT A TIME  Dispense: 30 tablet; Refill: 0    3. Constipation, unspecified constipation type      POC UA with positive nitrites and leukocyte esterase indicative of acute UTI  Will send urine for culture and follow sensitivities  Will treat empirically with Macrobid  Patient requested prescription of Pyridium to use in the future for her recurrent UTIs.  Discussed not using more than 2 days at a time  Will start conservative measures for constipation which may be contributing to her recurrent UTIs including daily fiber, MiraLAX, and stool softeners.  Advised patient to start taking OTC probiotic as well as eating fermented foods such as yogurt or sauerkraut         Discussion Summary:     Discussed plan of care in detail with patient today. Patient was encouraged to keep me informed of any acute changes, lack of improvement, or any new concerning symptoms.  Patient is also aware of reasons to seek emergent care. Patient verbalized understanding and agrees with plan of care.    This visit was billed based on time.  I spent 30 minutes caring for  Xuan Baron on this date of service. This time includes time spent by me in the following activities:preparing for the visit, reviewing tests, obtaining and/or reviewing a separately obtained history, performing a medically appropriate examination and/or evaluation , counseling and educating the patient/family/caregiver, ordering medications, tests, or procedures, referring and communicating with other health care professionals , documenting information in the medical record, independently interpreting results and communicating that information with the patient/family/caregiver, and care coordination.    Follow Up  No follow-ups on file.    Please note that portions of this note may have been completed with a voice recognition program.     Gary Reynolds MD, MPH  [facility] [place]

## 2025-05-09 LAB
BACTERIA UR CULT: ABNORMAL
BACTERIA UR CULT: ABNORMAL
OTHER ANTIBIOTIC SUSC ISLT: ABNORMAL

## 2025-06-01 PROBLEM — M72.2 PLANTAR FASCIA SYNDROME: Status: ACTIVE | Noted: 2025-05-30

## 2025-06-01 NOTE — H&P
"Patient Name: Xuan Baron  MRN: [id number]  : 1980  Gender: female     HPI: chronic plantar fasciitis left    History:failed conservative care requests surgical intervention    Past Medical History:   Diagnosis Date    Anemia     Asthma     Patient reported \"a touch years ago\"    Back pain     Body piercing     ears    Dysphagia     Patient reported noted more with breads and cakes but noted intermittently with various textures    Epigastric pain     GERD (gastroesophageal reflux disease)     Hypotension     Kidney infection     Reported recurrent UTI    Ovarian cyst     PONV (postoperative nausea and vomiting)     Seasonal allergies     Sleep apnea     Patient reported had testing done that was \"borderline\" and that no CPAP was warranted    Tattoo     Wears contact lenses     Patient advised that contacts will have to be removed DOS    Wears glasses        Past Surgical History:   Procedure Laterality Date    ENDOSCOPY      ENDOSCOPY N/A 2020    Procedure: ESOPHAGOGASTRODUODENOSCOPY WITH ARGON COAGULATION AND CLIP X1, BIOPSIES, AND DILATATION;  Surgeon: Mer Morrow MD;  Location: [facility] ENDOSCOPY;  Service: Gastroenterology;  Laterality: N/A;    LASER ABLATION OF THE CERVIX      SUBTOTAL HYSTERECTOMY      TUBAL ABDOMINAL LIGATION      WISDOM TOOTH EXTRACTION         Social History     Socioeconomic History    Marital status:    Tobacco Use    Smoking status: Never     Passive exposure: Never    Smokeless tobacco: Never   Vaping Use    Vaping status: Never Used   Substance and Sexual Activity    Alcohol use: No    Drug use: No    Sexual activity: Yes     Partners: Male     Birth control/protection: Hysterectomy       Family History   Problem Relation Age of Onset    Heart attack Mother     Stroke Mother     Thyroid disease Mother     Diabetes Father     Heart attack Father     Hypertension Father     Obesity Father     Vision loss Father     Migraines Son     Colon cancer Neg " Hx     Breast cancer Neg Hx     Ovarian cancer Neg Hx     Endometrial cancer Neg Hx        Prior to Admission Medications: clobetasol, ivermectin, nabumetone, sulindac, nitrofurantoin,      Allergies:  No Known Allergies     Vitals: /66, resp 18, temp 97.8, pulse 70    Review of Systems:   Within Normal Limits Abnormal   HEENT [x]    []     Cardiovascular [x]   []     Gastrointestinal [x]   []     Gentiourinary [x]   []     Neurologic [x]   []     Pulmonary [x]   []       Physical Exam:   Within Normal Limits Abnormal   HEENT [x]    []     Heart [x]   []     Lungs [x]   []     Abdomen [x]   []     Extremities [x]   []       Impression: Chronic plantar fasciitis left foot    Plan: ENDOSCOPIC PLANTAR FASCIOTOMY LEFT FOOT (Left)     Elvis Serrano DPM  6/1/2025

## 2025-06-03 RX ORDER — NEMOLIZUMAB-ILTO 30 MG/100MG
INJECTION, POWDER, LYOPHILIZED, FOR SOLUTION SUBCUTANEOUS
COMMUNITY
Start: 2025-05-14

## 2025-06-03 NOTE — PRE-PROCEDURE INSTRUCTIONS
PAT phone history completed with patient for upcoming procedure on 06/09/25 with Dr. Serrano.    PAT PASS reviewed with patient and they verbalize understanding of the following:     Do not eat or drink anything after midnight the night before procedure unless otherwise instructed by physician/surgeon's office, this includes no gum, candy, mints, tobacco products or e-cigarettes.  Do not shave the area to be operated on at least 48 hours prior to procedure.  Do not wear makeup, lotion, hair products, or nail polish.  Do not wear any jewelry and remove all piercings.  Do not wear any adhesive if you wear dentures.  Do not wear contacts; bring in glasses if needed.  Only take medications on the morning of procedure as instructed by PAT nurse per anesthesia guidelines or as instructed by physician's office.  If you are on any blood thinners reach out to the physician/surgeon's office for instructions on when/if they will need to be stopped prior to procedure.  Bring in picture ID and insurance card, advanced directive copies if applicable, CPAP/BIPAP/Inhalers if indicated morning of procedure, leave any other valuables at home.  Ensure you have arranged for someone to drive you home the day of your procedure and someone to care for you at home afterwards. It is recommended that you do not drive, drink alcohol, or make any major legal decisions for at least 24 hours after your procedure is complete.  ERAS instructions given unless otherwise instructed per surgeon's orders.    Instructions given on hospital entrance and registration location.

## 2025-06-06 ENCOUNTER — ANESTHESIA EVENT (OUTPATIENT)
Dept: PERIOP | Facility: HOSPITAL | Age: 45
End: 2025-06-06
Payer: COMMERCIAL

## 2025-06-06 NOTE — ANESTHESIA PREPROCEDURE EVALUATION
Anesthesia Evaluation     Patient summary reviewed, Nursing notes reviewed and pregnancy test completed   history of anesthetic complications:  PONV  NPO Solid Status: > 8 hours  NPO Liquid Status: > 2 hours           Airway   Mallampati: II  TM distance: >3 FB  Neck ROM: full  No difficulty expected  Dental - normal exam     Pulmonary - normal exam     ROS comment: Runny nose  Seasonal allergic rhinitis  Snoring      Cardiovascular - normal exam  Exercise tolerance: excellent (>7 METS)    ECG reviewed  Rhythm: regular  Rate: normal      ROS comment: Raynaud's phenomenon without gangrene    2020 SR with SA    Neuro/Psych  (+) headaches  GI/Hepatic/Renal/Endo    (+) GERD well controlled, renal disease (past kidney infection)-    ROS Comment: Chronic idiopathic constipation  Esophageal dysphagia  Esophageal spasm  Gastroesophageal reflux disease        Kidney infection   Reported recurrent UTI    Dysphagia Patient reported noted more with breads and cakes but noted intermittently with various textures     Musculoskeletal     (+) back pain      ROS comment: Plantar fascia syndrome  Abdominal  - normal exam   Substance History      OB/GYN negative ob/gyn ROS     Comment: Ovarian cyst    HYSTERECTOMY      Other   blood dyscrasia anemia,                   Anesthesia Plan    ASA 2     MAC   total IV anesthesia  (Risks and benefits discussed including risk of aspiration, recall and dental damage. All patient questions answered.    Will continue with plan of care.)  intravenous induction     Anesthetic plan, risks, benefits, and alternatives have been provided, discussed and informed consent has been obtained with: patient.  Pre-procedure education provided      CODE STATUS:

## 2025-06-09 ENCOUNTER — ANESTHESIA (OUTPATIENT)
Dept: PERIOP | Facility: HOSPITAL | Age: 45
End: 2025-06-09
Payer: COMMERCIAL

## 2025-06-09 ENCOUNTER — HOSPITAL ENCOUNTER (OUTPATIENT)
Facility: HOSPITAL | Age: 45
Setting detail: HOSPITAL OUTPATIENT SURGERY
Discharge: HOME | End: 2025-06-09
Attending: PODIATRIST | Admitting: PODIATRIST
Payer: COMMERCIAL

## 2025-06-09 VITALS
HEIGHT: 65 IN | SYSTOLIC BLOOD PRESSURE: 107 MMHG | BODY MASS INDEX: 24.99 KG/M2 | WEIGHT: 150 LBS | RESPIRATION RATE: 16 BRPM | OXYGEN SATURATION: 100 % | DIASTOLIC BLOOD PRESSURE: 65 MMHG | HEART RATE: 62 BPM | TEMPERATURE: 97.4 F

## 2025-06-09 DIAGNOSIS — M72.2 PLANTAR FASCIA SYNDROME: Primary | ICD-10-CM

## 2025-06-09 PROCEDURE — 25010000002 ONDANSETRON PER 1 MG: Performed by: NURSE ANESTHETIST, CERTIFIED REGISTERED

## 2025-06-09 PROCEDURE — 25010000002 MIDAZOLAM PER 1MG: Performed by: NURSE ANESTHETIST, CERTIFIED REGISTERED

## 2025-06-09 PROCEDURE — 25010000002 LIDOCAINE (CARDIAC): Performed by: NURSE ANESTHETIST, CERTIFIED REGISTERED

## 2025-06-09 PROCEDURE — 25010000002 PROPOFOL 10 MG/ML EMULSION: Performed by: NURSE ANESTHETIST, CERTIFIED REGISTERED

## 2025-06-09 PROCEDURE — 25010000002 CEFAZOLIN PER 500 MG: Performed by: PODIATRIST

## 2025-06-09 PROCEDURE — 25010000002 KETOROLAC TROMETHAMINE PER 15 MG: Performed by: NURSE ANESTHETIST, CERTIFIED REGISTERED

## 2025-06-09 PROCEDURE — 25810000003 LACTATED RINGERS PER 1000 ML: Performed by: PODIATRIST

## 2025-06-09 PROCEDURE — 25010000002 BUPIVACAINE (PF) 0.5 % SOLUTION 30 ML VIAL: Performed by: PODIATRIST

## 2025-06-09 PROCEDURE — 25010000002 DEXAMETHASONE PER 1 MG: Performed by: NURSE ANESTHETIST, CERTIFIED REGISTERED

## 2025-06-09 PROCEDURE — 25010000002 LIDOCAINE 1 % SOLUTION 10 ML VIAL: Performed by: PODIATRIST

## 2025-06-09 PROCEDURE — 25010000002 FENTANYL CITRATE (PF) 50 MCG/ML SOLUTION: Performed by: NURSE ANESTHETIST, CERTIFIED REGISTERED

## 2025-06-09 RX ORDER — FENTANYL CITRATE 0.05 MG/ML
INJECTION, SOLUTION INTRAMUSCULAR; INTRAVENOUS AS NEEDED
Status: DISCONTINUED | OUTPATIENT
Start: 2025-06-09 | End: 2025-06-09 | Stop reason: SURG

## 2025-06-09 RX ORDER — MIDAZOLAM HYDROCHLORIDE 2 MG/2ML
INJECTION, SOLUTION INTRAMUSCULAR; INTRAVENOUS AS NEEDED
Status: DISCONTINUED | OUTPATIENT
Start: 2025-06-09 | End: 2025-06-09 | Stop reason: SURG

## 2025-06-09 RX ORDER — SODIUM CHLORIDE 0.9 % (FLUSH) 0.9 %
10 SYRINGE (ML) INJECTION AS NEEDED
Status: DISCONTINUED | OUTPATIENT
Start: 2025-06-09 | End: 2025-06-09 | Stop reason: HOSPADM

## 2025-06-09 RX ORDER — KETAMINE HCL IN NACL, ISO-OSM 100MG/10ML
SYRINGE (ML) INJECTION AS NEEDED
Status: DISCONTINUED | OUTPATIENT
Start: 2025-06-09 | End: 2025-06-09 | Stop reason: SURG

## 2025-06-09 RX ORDER — SODIUM CHLORIDE, SODIUM LACTATE, POTASSIUM CHLORIDE, CALCIUM CHLORIDE 600; 310; 30; 20 MG/100ML; MG/100ML; MG/100ML; MG/100ML
1000 INJECTION, SOLUTION INTRAVENOUS CONTINUOUS
Status: DISCONTINUED | OUTPATIENT
Start: 2025-06-09 | End: 2025-06-09 | Stop reason: HOSPADM

## 2025-06-09 RX ORDER — KETOROLAC TROMETHAMINE 30 MG/ML
INJECTION, SOLUTION INTRAMUSCULAR; INTRAVENOUS AS NEEDED
Status: DISCONTINUED | OUTPATIENT
Start: 2025-06-09 | End: 2025-06-09 | Stop reason: SURG

## 2025-06-09 RX ORDER — PROPOFOL 10 MG/ML
VIAL (ML) INTRAVENOUS AS NEEDED
Status: DISCONTINUED | OUTPATIENT
Start: 2025-06-09 | End: 2025-06-09 | Stop reason: SURG

## 2025-06-09 RX ORDER — DEXAMETHASONE SODIUM PHOSPHATE 4 MG/ML
INJECTION, SOLUTION INTRA-ARTICULAR; INTRALESIONAL; INTRAMUSCULAR; INTRAVENOUS; SOFT TISSUE AS NEEDED
Status: DISCONTINUED | OUTPATIENT
Start: 2025-06-09 | End: 2025-06-09 | Stop reason: SURG

## 2025-06-09 RX ORDER — NEOMYCIN/BACITRACIN/POLYMYXINB 3.5-400-5K
OINTMENT (GRAM) TOPICAL AS NEEDED
Status: DISCONTINUED | OUTPATIENT
Start: 2025-06-09 | End: 2025-06-09 | Stop reason: HOSPADM

## 2025-06-09 RX ORDER — ONDANSETRON 2 MG/ML
INJECTION INTRAMUSCULAR; INTRAVENOUS AS NEEDED
Status: DISCONTINUED | OUTPATIENT
Start: 2025-06-09 | End: 2025-06-09 | Stop reason: SURG

## 2025-06-09 RX ORDER — IPRATROPIUM BROMIDE AND ALBUTEROL SULFATE 2.5; .5 MG/3ML; MG/3ML
3 SOLUTION RESPIRATORY (INHALATION) ONCE AS NEEDED
Status: DISCONTINUED | OUTPATIENT
Start: 2025-06-09 | End: 2025-06-09 | Stop reason: HOSPADM

## 2025-06-09 RX ADMIN — PROPOFOL 100 MG: 10 INJECTION, EMULSION INTRAVENOUS at 10:02

## 2025-06-09 RX ADMIN — SODIUM CHLORIDE, POTASSIUM CHLORIDE, SODIUM LACTATE AND CALCIUM CHLORIDE: 600; 310; 30; 20 INJECTION, SOLUTION INTRAVENOUS at 09:57

## 2025-06-09 RX ADMIN — KETOROLAC TROMETHAMINE 15 MG: 30 INJECTION, SOLUTION INTRAMUSCULAR at 10:13

## 2025-06-09 RX ADMIN — MIDAZOLAM HYDROCHLORIDE 2 MG: 1 INJECTION, SOLUTION INTRAMUSCULAR; INTRAVENOUS at 09:57

## 2025-06-09 RX ADMIN — Medication 10 MG: at 10:02

## 2025-06-09 RX ADMIN — ONDANSETRON 4 MG: 2 INJECTION INTRAMUSCULAR; INTRAVENOUS at 10:02

## 2025-06-09 RX ADMIN — LIDOCAINE HYDROCHLORIDE 60 MG: 20 INJECTION, SOLUTION INTRAVENOUS at 10:02

## 2025-06-09 RX ADMIN — SODIUM CHLORIDE 2 G: 9 INJECTION, SOLUTION INTRAVENOUS at 09:28

## 2025-06-09 RX ADMIN — DEXAMETHASONE SODIUM PHOSPHATE 8 MG: 4 INJECTION, SOLUTION INTRA-ARTICULAR; INTRALESIONAL; INTRAMUSCULAR; INTRAVENOUS; SOFT TISSUE at 10:06

## 2025-06-09 RX ADMIN — FENTANYL CITRATE 50 MCG: 0.05 INJECTION, SOLUTION INTRAMUSCULAR; INTRAVENOUS at 10:02

## 2025-06-09 NOTE — OP NOTE
Elvis Serrano DPM    OPERATIVE REPORT         PATIENT NAME: Xuan Baron                              YOB: 1980      DATE OF SURGERY: 6/9/2025     PREOP DIAGNOSIS: Chronic plantar fasciitis left     POSTOP DIAGNOSIS: Same    PROCEDURE:  EPF (Left)     SURGEON: Elvis Serrano DPM    ANESTHESIA: Local MAC 10 cc of one-to-one mix 1% lidocaine plain mixed with half percent Marcaine plain    OR STAFF: Circulator: Jere Bedolla RN  Scrub Person: Milagro Goldman; Tarah Zhu       OPERATION DESCRIPTION: Patient brought the op room supine position.  Preprepped with alcohol.  Local injected around incision sites.  Foot prepped scrubbed and draped aseptically.  1 cm incision was made medially with 15 blade 4 to 5 cm distal to the posterior heel and 1 cm from the inferior heel.  Trocar and cannula were placed into the incision site inferior to the plantar fascia and were advanced across the skin plantarly and used to tent the skin laterally.  I half centimeter incision was made with a 15 blade on the lateral foot to exit the trocar and cannula.  The trocar was removed from the cannula and a cannula placed at the 12 o'clock position.  Endoscope placed laterally and hook blade medially under visualization the medial and central plantar fascial bands were released.  Instrumentation removed.  Skin closure with 4-0 nylon horizontal mattress type sutures and simple sutures.  Postop dressings included cut up Adaptic, triple antibiotic ointment, 4 x 4's, 4 inch Monie 4 inch Ace wrap.  Patient will be discharged weightbearing as tolerated surgical shoe and follow-up next week for dressing changes.    Estimated Blood Loss:  Minimal    TOURNIQUET TIME: Pneumatic ankle tourniquet 250 mmHg 7 minutes      SPECIMENS:  None    DRAINS: None    COMPLICATIONS:  None    DISPOSITION:  Stable to recovery    Elvis Serrano DPM  6/9/2025  10:20 EDT

## 2025-06-09 NOTE — ANESTHESIA POSTPROCEDURE EVALUATION
Patient: Xuan Baron    Procedure Summary       Date: 06/09/25 Room / Location: [facility] OR  /  [facility]    Anesthesia Start: 0955 Anesthesia Stop: 1019    Procedure: ENDOSCOPIC PLANTAR FASCIOTOMY LEFT FOOT (Left: Foot) Diagnosis:     Surgeons: Elvis Serrano DPM Provider: Ml Yanez CRNA    Anesthesia Type: MAC ASA Status: 2            Anesthesia Type: MAC    Vitals  No vitals data found for the desired time range.          Post Anesthesia Care and Evaluation    Patient location during evaluation: PHASE II  Patient participation: complete - patient participated  Level of consciousness: sleepy but conscious  Pain score: 0  Pain management: satisfactory to patient    Airway patency: patent  Anesthetic complications: No anesthetic complications  PONV Status: none  Cardiovascular status: acceptable and stable  Respiratory status: acceptable  Hydration status: acceptable    Comments: Vitals signs as noted in nursing documentation as per protocol.

## 2025-06-09 NOTE — DISCHARGE INSTRUCTIONS
No pushing, pulling, tugging,  heavy lifting, or strenuous activity.  No major decision making, driving, or drinking alcoholic beverages for 24 hours. ( due to the medications you have  received)  Always use good hand hygiene/washing techniques.  NO driving while taking pain medications.    * if you have an incision:  Check your incision area every day for signs of infection.   Check for:  * more redness, swelling, or pain  *more fluid or blood  *warmth  *pus or bad smell.    To assist you in voiding:  Drink plenty of fluids  Listen to running water while attempting to void.    If you are unable to urinate and you have an uncomfortable urge to void or it has been   6 hours since you were discharged, return to the Emergency Room.    Keep your left foot elevated and apply ice to incision site as directed per physician.  Apply ice every 2-3 hours for 20-30 minutes, remove, and reapply.  Do not use ice continuously.                                                       Dr. Elvis Serrano                                                          Post Op Instructions      DO NOT CHANGE YOUR DRESSING!    DO NOT GET YOUR DRESSING WET!    Elevate your foot on two pillows.  Try to elevate at your heart level to prevent throbbing.    Apply Ice to the top of your foot 15 minutes per hour.  DO NOT apply ice to toes as this can cause frostbite.    Weight bearing is allowed in Post op shoe    {crutches to be used.    Follow up in [place] on: June 23 11:15 AM [place] office    Take prescriptions below as instructed:          Expect pain, numbness, bruising and swelling after your surgery.    Call Dr. Serrano in case of emergency or if the following occur:     -Fever, chills, nausea or vomiting   -Blood soaked or wet dressing   -Severe calf or leg pain   -Pain not controlled by medications   -Cold, purple, blue or black toes   -Trouble breathing, itching rash or hives    I can be reached at [phone] during the day and  [phone] during off hours.  If you cannot reach me and are having an emergency, please go to the nearest emergency room.

## 2025-06-27 ENCOUNTER — PRE-ADMISSION TESTING (OUTPATIENT)
Dept: PREADMISSION TESTING | Facility: HOSPITAL | Age: 45
End: 2025-06-27

## 2025-06-27 VITALS — HEIGHT: 65 IN | WEIGHT: 149.69 LBS | BODY MASS INDEX: 24.94 KG/M2

## 2025-06-27 LAB
ANION GAP SERPL CALCULATED.3IONS-SCNC: 8.6 MMOL/L (ref 5–15)
BUN SERPL-MCNC: 11 MG/DL (ref 6–20)
BUN/CREAT SERPL: 14.1 (ref 7–25)
CALCIUM SPEC-SCNC: 9 MG/DL (ref 8.6–10.5)
CHLORIDE SERPL-SCNC: 107 MMOL/L (ref 98–107)
CO2 SERPL-SCNC: 24.4 MMOL/L (ref 22–29)
CREAT SERPL-MCNC: 0.78 MG/DL (ref 0.57–1)
DEPRECATED RDW RBC AUTO: 38.9 FL (ref 37–54)
EGFRCR SERPLBLD CKD-EPI 2021: 95.6 ML/MIN/1.73
ERYTHROCYTE [DISTWIDTH] IN BLOOD BY AUTOMATED COUNT: 11.9 % (ref 12.3–15.4)
GLUCOSE SERPL-MCNC: 85 MG/DL (ref 65–99)
HBA1C MFR BLD: 4.87 % (ref 4.8–5.6)
HCT VFR BLD AUTO: 37.7 % (ref 34–46.6)
HGB BLD-MCNC: 12.9 G/DL (ref 12–15.9)
MCH RBC QN AUTO: 30.6 PG (ref 26.6–33)
MCHC RBC AUTO-ENTMCNC: 34.2 G/DL (ref 31.5–35.7)
MCV RBC AUTO: 89.3 FL (ref 79–97)
PLATELET # BLD AUTO: 204 10*3/MM3 (ref 140–450)
PMV BLD AUTO: 9.9 FL (ref 6–12)
POTASSIUM SERPL-SCNC: 3.7 MMOL/L (ref 3.5–5.2)
RBC # BLD AUTO: 4.22 10*6/MM3 (ref 3.77–5.28)
SODIUM SERPL-SCNC: 140 MMOL/L (ref 136–145)
WBC NRBC COR # BLD AUTO: 5.86 10*3/MM3 (ref 3.4–10.8)

## 2025-06-27 PROCEDURE — 36415 COLL VENOUS BLD VENIPUNCTURE: CPT

## 2025-06-27 PROCEDURE — 85027 COMPLETE CBC AUTOMATED: CPT

## 2025-06-27 PROCEDURE — 80048 BASIC METABOLIC PNL TOTAL CA: CPT

## 2025-06-27 PROCEDURE — 83036 HEMOGLOBIN GLYCOSYLATED A1C: CPT

## 2025-06-27 RX ORDER — CYCLOBENZAPRINE HCL 5 MG
TABLET ORAL
COMMUNITY
Start: 2025-06-13

## 2025-06-27 RX ORDER — FLUCONAZOLE 150 MG/1
TABLET ORAL
COMMUNITY
Start: 2025-06-27

## 2025-06-27 RX ORDER — DOXYCYCLINE 100 MG/1
CAPSULE ORAL
COMMUNITY
Start: 2025-06-27

## 2025-06-27 RX ORDER — MUPIROCIN 2 %
OINTMENT (GRAM) TOPICAL
COMMUNITY
Start: 2025-06-18

## 2025-06-27 RX ORDER — FINASTERIDE 5 MG/1
TABLET, FILM COATED ORAL
COMMUNITY
Start: 2025-06-27

## 2025-06-27 RX ORDER — ONDANSETRON 4 MG/1
TABLET, ORALLY DISINTEGRATING ORAL
COMMUNITY
Start: 2025-06-13

## 2025-06-27 NOTE — PAT
An arrival time for procedure was not provided during PAT visit. If patient had any questions or concerns about their arrival time, they were instructed to contact their surgeon/physician.  Additionally, if the patient referred to an arrival time that was acquired from their my chart account, patient was encouraged to verify that time with their surgeon/physician. Arrival times are NOT provided in Pre Admission Testing Department.    Patient viewed general PAT education video as instructed in their preoperative information received from their surgeon.  Patient stated the general PAT education video was viewed in its entirety and survey completed.  Copies of PAT general education handouts (Incentive Spirometry, Meds to Beds Program, Patient Belongings, Pre-op skin preparation instructions, Blood Glucose testing, Visitor policy, Surgery FAQ, Code H) distributed to patient if not printed. Education related to the PAT pass and skin preparation for surgery (if applicable) completed in PAT as a reinforcement to PAT education video. Patient instructed to return PAT pass provided today as well as completed skin preparation sheet (if applicable) on the day of procedure.     Additionally if patient had not viewed video yet but intended to view it at home or in our waiting area, then referred them to the handout with QR code/link provided during PAT visit.  Encouraged patient/family to read PAT general education handouts thoroughly and notify PAT staff with any questions or concerns. Patient verbalized understanding of all information and priority content.    Patient denies any current skin issues.     Patient to apply Chlorhexadine wipes  to surgical area (as instructed) the night before procedure and the AM of procedure. Wipes provided.    Patient instructed to drink 20 ounces of Gatorade or Gatorlyte (if diabetic) and it needs to be completed 1 hour (for Main OR patients) or 2 hours (scheduled  section & [facility] [place] [facility]\""C  patients) before given arrival time for procedure (NO RED Gatorade and NO Gatorade Zero).    Patient verbalized understanding.

## 2025-07-01 ENCOUNTER — ANESTHESIA EVENT (OUTPATIENT)
Dept: PERIOP | Facility: HOSPITAL | Age: 45
End: 2025-07-01

## 2025-07-03 ENCOUNTER — HOSPITAL ENCOUNTER (OUTPATIENT)
Facility: HOSPITAL | Age: 45
Setting detail: HOSPITAL OUTPATIENT SURGERY
Discharge: HOME | End: 2025-07-03
Attending: PLASTIC SURGERY | Admitting: PLASTIC SURGERY

## 2025-07-03 ENCOUNTER — ANESTHESIA (OUTPATIENT)
Dept: PERIOP | Facility: HOSPITAL | Age: 45
End: 2025-07-03

## 2025-07-03 VITALS
HEIGHT: 65 IN | DIASTOLIC BLOOD PRESSURE: 61 MMHG | WEIGHT: 145 LBS | OXYGEN SATURATION: 100 % | SYSTOLIC BLOOD PRESSURE: 111 MMHG | BODY MASS INDEX: 24.16 KG/M2 | TEMPERATURE: 98.2 F | HEART RATE: 63 BPM | RESPIRATION RATE: 16 BRPM

## 2025-07-03 PROCEDURE — 25010000002 GENTAMICIN PER 80 MG: Performed by: PLASTIC SURGERY

## 2025-07-03 PROCEDURE — 25010000002 PROPOFOL 10 MG/ML EMULSION

## 2025-07-03 PROCEDURE — 25010000002 FENTANYL CITRATE (PF) 50 MCG/ML SOLUTION

## 2025-07-03 PROCEDURE — C1789 PROSTHESIS, BREAST, IMP: HCPCS | Performed by: PLASTIC SURGERY

## 2025-07-03 PROCEDURE — 25010000002 CEFAZOLIN PER 500 MG: Performed by: PLASTIC SURGERY

## 2025-07-03 PROCEDURE — 25010000002 SUGAMMADEX 200 MG/2ML SOLUTION

## 2025-07-03 PROCEDURE — 63710000001 PROMETHAZINE PER 25 MG

## 2025-07-03 PROCEDURE — 25010000002 ONDANSETRON PER 1 MG

## 2025-07-03 PROCEDURE — S0260 H&P FOR SURGERY: HCPCS | Performed by: PHYSICIAN ASSISTANT

## 2025-07-03 PROCEDURE — 25010000002 HYDROMORPHONE 1 MG/ML SOLUTION

## 2025-07-03 PROCEDURE — 25010000002 FENTANYL CITRATE (PF) 100 MCG/2ML SOLUTION

## 2025-07-03 PROCEDURE — 25010000002 MIDAZOLAM PER 1 MG

## 2025-07-03 PROCEDURE — 25810000003 SODIUM CHLORIDE PER 500 ML: Performed by: PLASTIC SURGERY

## 2025-07-03 PROCEDURE — 25010000002 LIDOCAINE 1% - EPINEPHRINE 1:100000 1 %-1:100000 SOLUTION 50 ML VIAL: Performed by: PLASTIC SURGERY

## 2025-07-03 PROCEDURE — 25810000003 LACTATED RINGERS PER 1000 ML: Performed by: ANESTHESIOLOGY

## 2025-07-03 PROCEDURE — 25010000002 LIDOCAINE PF 1% 1 % SOLUTION: Performed by: ANESTHESIOLOGY

## 2025-07-03 PROCEDURE — 25010000002 LIDOCAINE PF 1% 1 % SOLUTION

## 2025-07-03 PROCEDURE — 25010000002 DEXAMETHASONE PER 1 MG

## 2025-07-03 DEVICE — DEV CONTRL TISS STRATAFIX SPIRAL MNCRYL UD 3/0 PLS 30CM: Type: IMPLANTABLE DEVICE | Site: BREAST | Status: FUNCTIONAL

## 2025-07-03 DEVICE — IMPLANTABLE DEVICE: Type: IMPLANTABLE DEVICE | Site: BREAST | Status: FUNCTIONAL

## 2025-07-03 RX ORDER — SODIUM CHLORIDE 0.9 % (FLUSH) 0.9 %
10 SYRINGE (ML) INJECTION AS NEEDED
Status: DISCONTINUED | OUTPATIENT
Start: 2025-07-03 | End: 2025-07-03 | Stop reason: HOSPADM

## 2025-07-03 RX ORDER — HYDROCODONE BITARTRATE AND ACETAMINOPHEN 5; 325 MG/1; MG/1
1 TABLET ORAL ONCE AS NEEDED
Status: DISCONTINUED | OUTPATIENT
Start: 2025-07-03 | End: 2025-07-03 | Stop reason: HOSPADM

## 2025-07-03 RX ORDER — NALOXONE HCL 0.4 MG/ML
0.4 VIAL (ML) INJECTION AS NEEDED
Status: DISCONTINUED | OUTPATIENT
Start: 2025-07-03 | End: 2025-07-03 | Stop reason: HOSPADM

## 2025-07-03 RX ORDER — SODIUM CHLORIDE, SODIUM LACTATE, POTASSIUM CHLORIDE, CALCIUM CHLORIDE 600; 310; 30; 20 MG/100ML; MG/100ML; MG/100ML; MG/100ML
9 INJECTION, SOLUTION INTRAVENOUS CONTINUOUS
Status: DISCONTINUED | OUTPATIENT
Start: 2025-07-03 | End: 2025-07-03 | Stop reason: HOSPADM

## 2025-07-03 RX ORDER — HYDROMORPHONE HYDROCHLORIDE 1 MG/ML
0.5 INJECTION, SOLUTION INTRAMUSCULAR; INTRAVENOUS; SUBCUTANEOUS
Status: DISCONTINUED | OUTPATIENT
Start: 2025-07-03 | End: 2025-07-03 | Stop reason: HOSPADM

## 2025-07-03 RX ORDER — HYDROCODONE BITARTRATE AND ACETAMINOPHEN 7.5; 325 MG/1; MG/1
1 TABLET ORAL EVERY 4 HOURS PRN
Status: DISCONTINUED | OUTPATIENT
Start: 2025-07-03 | End: 2025-07-03 | Stop reason: HOSPADM

## 2025-07-03 RX ORDER — IPRATROPIUM BROMIDE AND ALBUTEROL SULFATE 2.5; .5 MG/3ML; MG/3ML
3 SOLUTION RESPIRATORY (INHALATION) ONCE AS NEEDED
Status: DISCONTINUED | OUTPATIENT
Start: 2025-07-03 | End: 2025-07-03 | Stop reason: HOSPADM

## 2025-07-03 RX ORDER — FENTANYL CITRATE 50 UG/ML
INJECTION, SOLUTION INTRAMUSCULAR; INTRAVENOUS
Status: COMPLETED
Start: 2025-07-03 | End: 2025-07-03

## 2025-07-03 RX ORDER — HYDRALAZINE HYDROCHLORIDE 20 MG/ML
5 INJECTION INTRAMUSCULAR; INTRAVENOUS
Status: DISCONTINUED | OUTPATIENT
Start: 2025-07-03 | End: 2025-07-03 | Stop reason: HOSPADM

## 2025-07-03 RX ORDER — PROMETHAZINE HYDROCHLORIDE 25 MG/1
TABLET ORAL
Status: COMPLETED
Start: 2025-07-03 | End: 2025-07-03

## 2025-07-03 RX ORDER — PROMETHAZINE HYDROCHLORIDE 25 MG/1
25 SUPPOSITORY RECTAL ONCE AS NEEDED
Status: COMPLETED | OUTPATIENT
Start: 2025-07-03 | End: 2025-07-03

## 2025-07-03 RX ORDER — FAMOTIDINE 10 MG/ML
20 INJECTION, SOLUTION INTRAVENOUS ONCE
Status: DISCONTINUED | OUTPATIENT
Start: 2025-07-03 | End: 2025-07-03 | Stop reason: HOSPADM

## 2025-07-03 RX ORDER — ONDANSETRON 2 MG/ML
INJECTION INTRAMUSCULAR; INTRAVENOUS AS NEEDED
Status: DISCONTINUED | OUTPATIENT
Start: 2025-07-03 | End: 2025-07-03 | Stop reason: SURG

## 2025-07-03 RX ORDER — PROPOFOL 10 MG/ML
VIAL (ML) INTRAVENOUS AS NEEDED
Status: DISCONTINUED | OUTPATIENT
Start: 2025-07-03 | End: 2025-07-03 | Stop reason: SURG

## 2025-07-03 RX ORDER — FENTANYL CITRATE 50 UG/ML
INJECTION, SOLUTION INTRAMUSCULAR; INTRAVENOUS AS NEEDED
Status: DISCONTINUED | OUTPATIENT
Start: 2025-07-03 | End: 2025-07-03 | Stop reason: SURG

## 2025-07-03 RX ORDER — SODIUM CHLORIDE 0.9 % (FLUSH) 0.9 %
3-10 SYRINGE (ML) INJECTION AS NEEDED
Status: DISCONTINUED | OUTPATIENT
Start: 2025-07-03 | End: 2025-07-03 | Stop reason: HOSPADM

## 2025-07-03 RX ORDER — LABETALOL HYDROCHLORIDE 5 MG/ML
5 INJECTION, SOLUTION INTRAVENOUS
Status: DISCONTINUED | OUTPATIENT
Start: 2025-07-03 | End: 2025-07-03 | Stop reason: HOSPADM

## 2025-07-03 RX ORDER — PROMETHAZINE HYDROCHLORIDE 25 MG/1
25 TABLET ORAL ONCE AS NEEDED
Status: COMPLETED | OUTPATIENT
Start: 2025-07-03 | End: 2025-07-03

## 2025-07-03 RX ORDER — LIDOCAINE HYDROCHLORIDE 10 MG/ML
INJECTION, SOLUTION EPIDURAL; INFILTRATION; INTRACAUDAL; PERINEURAL AS NEEDED
Status: DISCONTINUED | OUTPATIENT
Start: 2025-07-03 | End: 2025-07-03 | Stop reason: SURG

## 2025-07-03 RX ORDER — SODIUM CHLORIDE 9 MG/ML
INJECTION, SOLUTION INTRAVENOUS AS NEEDED
Status: DISCONTINUED | OUTPATIENT
Start: 2025-07-03 | End: 2025-07-03 | Stop reason: HOSPADM

## 2025-07-03 RX ORDER — SODIUM CHLORIDE 0.9 % (FLUSH) 0.9 %
3 SYRINGE (ML) INJECTION EVERY 12 HOURS SCHEDULED
Status: DISCONTINUED | OUTPATIENT
Start: 2025-07-03 | End: 2025-07-03 | Stop reason: HOSPADM

## 2025-07-03 RX ORDER — SCOPOLAMINE 1 MG/3D
1 PATCH, EXTENDED RELEASE TRANSDERMAL
Status: DISCONTINUED | OUTPATIENT
Start: 2025-07-03 | End: 2025-07-03 | Stop reason: HOSPADM

## 2025-07-03 RX ORDER — FENTANYL CITRATE 50 UG/ML
50 INJECTION, SOLUTION INTRAMUSCULAR; INTRAVENOUS
Status: DISCONTINUED | OUTPATIENT
Start: 2025-07-03 | End: 2025-07-03 | Stop reason: HOSPADM

## 2025-07-03 RX ORDER — SODIUM CHLORIDE 0.9 % (FLUSH) 0.9 %
10 SYRINGE (ML) INJECTION EVERY 12 HOURS SCHEDULED
Status: DISCONTINUED | OUTPATIENT
Start: 2025-07-03 | End: 2025-07-03 | Stop reason: HOSPADM

## 2025-07-03 RX ORDER — ROCURONIUM BROMIDE 10 MG/ML
INJECTION, SOLUTION INTRAVENOUS AS NEEDED
Status: DISCONTINUED | OUTPATIENT
Start: 2025-07-03 | End: 2025-07-03 | Stop reason: SURG

## 2025-07-03 RX ORDER — SODIUM CHLORIDE 9 MG/ML
9 INJECTION, SOLUTION INTRAVENOUS AS NEEDED
Status: DISCONTINUED | OUTPATIENT
Start: 2025-07-03 | End: 2025-07-03 | Stop reason: HOSPADM

## 2025-07-03 RX ORDER — FAMOTIDINE 20 MG/1
20 TABLET, FILM COATED ORAL ONCE
Status: COMPLETED | OUTPATIENT
Start: 2025-07-03 | End: 2025-07-03

## 2025-07-03 RX ORDER — DEXMEDETOMIDINE HYDROCHLORIDE 100 UG/ML
INJECTION, SOLUTION INTRAVENOUS AS NEEDED
Status: DISCONTINUED | OUTPATIENT
Start: 2025-07-03 | End: 2025-07-03 | Stop reason: SURG

## 2025-07-03 RX ORDER — MIDAZOLAM HYDROCHLORIDE 1 MG/ML
1 INJECTION, SOLUTION INTRAMUSCULAR; INTRAVENOUS
Status: DISCONTINUED | OUTPATIENT
Start: 2025-07-03 | End: 2025-07-03 | Stop reason: HOSPADM

## 2025-07-03 RX ORDER — LIDOCAINE HYDROCHLORIDE 10 MG/ML
0.5 INJECTION, SOLUTION EPIDURAL; INFILTRATION; INTRACAUDAL; PERINEURAL ONCE AS NEEDED
Status: COMPLETED | OUTPATIENT
Start: 2025-07-03 | End: 2025-07-03

## 2025-07-03 RX ORDER — MIDAZOLAM HYDROCHLORIDE 1 MG/ML
INJECTION, SOLUTION INTRAMUSCULAR; INTRAVENOUS AS NEEDED
Status: DISCONTINUED | OUTPATIENT
Start: 2025-07-03 | End: 2025-07-03 | Stop reason: SURG

## 2025-07-03 RX ORDER — DROPERIDOL 2.5 MG/ML
0.62 INJECTION, SOLUTION INTRAMUSCULAR; INTRAVENOUS
Status: DISCONTINUED | OUTPATIENT
Start: 2025-07-03 | End: 2025-07-03 | Stop reason: HOSPADM

## 2025-07-03 RX ORDER — ONDANSETRON 2 MG/ML
4 INJECTION INTRAMUSCULAR; INTRAVENOUS ONCE AS NEEDED
Status: COMPLETED | OUTPATIENT
Start: 2025-07-03 | End: 2025-07-03

## 2025-07-03 RX ORDER — ONDANSETRON 2 MG/ML
INJECTION INTRAMUSCULAR; INTRAVENOUS
Status: COMPLETED
Start: 2025-07-03 | End: 2025-07-03

## 2025-07-03 RX ORDER — DROPERIDOL 2.5 MG/ML
0.62 INJECTION, SOLUTION INTRAMUSCULAR; INTRAVENOUS ONCE AS NEEDED
Status: DISCONTINUED | OUTPATIENT
Start: 2025-07-03 | End: 2025-07-03 | Stop reason: HOSPADM

## 2025-07-03 RX ORDER — DEXAMETHASONE SODIUM PHOSPHATE 4 MG/ML
INJECTION, SOLUTION INTRA-ARTICULAR; INTRALESIONAL; INTRAMUSCULAR; INTRAVENOUS; SOFT TISSUE AS NEEDED
Status: DISCONTINUED | OUTPATIENT
Start: 2025-07-03 | End: 2025-07-03 | Stop reason: SURG

## 2025-07-03 RX ADMIN — PROMETHAZINE HYDROCHLORIDE 25 MG: 25 TABLET ORAL at 13:31

## 2025-07-03 RX ADMIN — DEXMEDETOMIDINE HYDROCHLORIDE 8 MCG: 100 INJECTION, SOLUTION INTRAVENOUS at 11:24

## 2025-07-03 RX ADMIN — FENTANYL CITRATE 50 MCG: 50 INJECTION, SOLUTION INTRAMUSCULAR; INTRAVENOUS at 13:27

## 2025-07-03 RX ADMIN — ONDANSETRON 4 MG: 2 INJECTION INTRAMUSCULAR; INTRAVENOUS at 13:08

## 2025-07-03 RX ADMIN — ONDANSETRON 4 MG: 2 INJECTION INTRAMUSCULAR; INTRAVENOUS at 12:20

## 2025-07-03 RX ADMIN — PROPOFOL 25 MCG/KG/MIN: 10 INJECTION, EMULSION INTRAVENOUS at 11:00

## 2025-07-03 RX ADMIN — SCOPOLAMINE 1 PATCH: 1.5 PATCH, EXTENDED RELEASE TRANSDERMAL at 10:17

## 2025-07-03 RX ADMIN — HYDROMORPHONE HYDROCHLORIDE 0.5 MG: 1 INJECTION, SOLUTION INTRAMUSCULAR; INTRAVENOUS; SUBCUTANEOUS at 13:44

## 2025-07-03 RX ADMIN — FAMOTIDINE 20 MG: 20 TABLET, FILM COATED ORAL at 10:17

## 2025-07-03 RX ADMIN — FENTANYL CITRATE 100 MCG: 50 INJECTION, SOLUTION INTRAMUSCULAR; INTRAVENOUS at 10:53

## 2025-07-03 RX ADMIN — FENTANYL CITRATE 50 MCG: 50 INJECTION, SOLUTION INTRAMUSCULAR; INTRAVENOUS at 13:11

## 2025-07-03 RX ADMIN — DEXAMETHASONE SODIUM PHOSPHATE 8 MG: 4 INJECTION, SOLUTION INTRA-ARTICULAR; INTRALESIONAL; INTRAMUSCULAR; INTRAVENOUS; SOFT TISSUE at 10:58

## 2025-07-03 RX ADMIN — ROCURONIUM BROMIDE 50 MG: 10 INJECTION, SOLUTION INTRAVENOUS at 10:53

## 2025-07-03 RX ADMIN — LIDOCAINE HYDROCHLORIDE 50 MG: 10 INJECTION, SOLUTION EPIDURAL; INFILTRATION; INTRACAUDAL; PERINEURAL at 10:53

## 2025-07-03 RX ADMIN — SODIUM CHLORIDE 2000 MG: 900 INJECTION INTRAVENOUS at 10:58

## 2025-07-03 RX ADMIN — SUGAMMADEX 200 MG: 100 INJECTION, SOLUTION INTRAVENOUS at 12:27

## 2025-07-03 RX ADMIN — LIDOCAINE HYDROCHLORIDE 0.5 ML: 10 INJECTION, SOLUTION EPIDURAL; INFILTRATION; INTRACAUDAL; PERINEURAL at 10:15

## 2025-07-03 RX ADMIN — MIDAZOLAM HYDROCHLORIDE 2 MG: 1 INJECTION, SOLUTION INTRAMUSCULAR; INTRAVENOUS at 10:50

## 2025-07-03 RX ADMIN — SODIUM CHLORIDE, SODIUM LACTATE, POTASSIUM CHLORIDE, CALCIUM CHLORIDE 9 ML/HR: 20; 30; 600; 310 INJECTION, SOLUTION INTRAVENOUS at 10:15

## 2025-07-03 RX ADMIN — PROPOFOL 160 MG: 10 INJECTION, EMULSION INTRAVENOUS at 10:53

## 2025-07-03 NOTE — H&P
"Pre-Op H&P  Xuan Baron  [id number]  1980    Chief complaint: \"Breast augmentation\"    HPI:    Patient is a 45 y.o.female who presents with cosmetic want of breast augmentation    Review of Systems:  General ROS: negative for chills, fever or skin lesions;  No changes since last office visit.  Neg for recent sick exposure  Cardiovascular ROS: no chest pain or dyspnea on exertion  Respiratory ROS: no cough, shortness of breath, or wheezing    Allergies: No Known Allergies    Home Meds:    No current facility-administered medications on file prior to encounter.     Current Outpatient Medications on File Prior to Encounter   Medication Sig Dispense Refill    clobetasol (TEMOVATE) 0.05 % external solution APPLY TO SCALP WEEKLY AS NEEDED AS DIRECTED      fluticasone (FLONASE) 50 MCG/ACT nasal spray Administer 2 sprays into the nostril(s) as directed by provider Daily. (Patient taking differently: Administer 2 sprays into the nostril(s) as directed by provider Daily As Needed.) 16 g 2    hydrOXYzine (ATARAX) 25 MG tablet Take 1 tablet by mouth Every 4 (Four) Hours As Needed for Itching. 90 tablet 1    minoxidil (LONITEN) 2.5 MG tablet Take 1 tablet by mouth Daily.      Nemluvio 30 MG auto-injector  (Patient not taking: Reported on 6/3/2025)      sertraline (Zoloft) 25 MG tablet Take 1 tablet by mouth Daily. (Patient not taking: Reported on 5/6/2025) 30 tablet 1    triamcinolone (KENALOG) 0.1 % cream Apply 1 Application topically to the appropriate area as directed As Needed.         PMH:   Past Medical History:   Diagnosis Date    Anemia     Back pain     Body piercing     ears    Dysphagia     Patient reported noted more with breads and cakes but noted intermittently with various textures    Epigastric pain     GERD (gastroesophageal reflux disease)     Hypotension     Kidney infection     Reported recurrent UTI    Ovarian cyst     PONV (postoperative nausea and vomiting)     Seasonal allergies     Tattoo     " Wears contact lenses     Patient advised that contacts will have to be removed DOS    Wears glasses      PSH:    Past Surgical History:   Procedure Laterality Date    BREAST BIOPSY Left     breast marker present    ENDOSCOPY N/A 11/06/2020    Procedure: ESOPHAGOGASTRODUODENOSCOPY WITH ARGON COAGULATION AND CLIP X1, BIOPSIES, AND DILATATION;  Surgeon: Mer Morrow MD;  Location: [facility] ENDOSCOPY;  Service: Gastroenterology;  Laterality: N/A;    LASER ABLATION OF THE CERVIX      PLANTAR FASCIA RELEASE Left 06/09/2025    Procedure: ENDOSCOPIC PLANTAR FASCIOTOMY LEFT FOOT;  Surgeon: Elvis Serrano DPM;  Location: [facility] OR;  Service: Podiatry;  Laterality: Left;    SUBTOTAL HYSTERECTOMY      TUBAL ABDOMINAL LIGATION      WISDOM TOOTH EXTRACTION       Patient denies allergy to contrast dye or latex  Immunization History:  Influenza: No  Pneumococcal: No  Tetanus: Up-to-date    Social History:   Tobacco:   Social History     Tobacco Use   Smoking Status Never    Passive exposure: Never   Smokeless Tobacco Never      Alcohol:     Social History     Substance and Sexual Activity   Alcohol Use No       Vitals:           LMP  (LMP Unknown)     Physical Exam:  General Appearance:    Alert, cooperative, no distress, appears stated age   Head:    Normocephalic, without obvious abnormality, atraumatic   Lungs:   Clear to auscultation bilaterally to the bases    Heart: S1-S2 without rubs murmurs or gallops    Abdomen:  Soft, nontender, bowel sounds present throughout.   Breast Exam:    deferred   Genitalia:    deferred   Extremities:   Extremities normal, atraumatic, no cyanosis or edema   Skin:   Skin color, texture, turgor normal, no rashes or lesions   Neurologic:   Grossly intact   Results Review  LABS:  Lab Results   Component Value Date    WBC 5.86 06/27/2025    HGB 12.9 06/27/2025    HCT 37.7 06/27/2025    MCV 89.3 06/27/2025     06/27/2025    NEUTROABS 1.83 (L) 01/25/2019    GLUCOSE 85 06/27/2025     BUN 11.0 06/27/2025    CREATININE 0.78 06/27/2025    EGFRIFNONA 80 01/25/2019    EGFRIFAFRI 97 01/25/2019     06/27/2025    K 3.7 06/27/2025     06/27/2025    CO2 24.4 06/27/2025    MG 2.2 10/08/2024    CALCIUM 9.0 06/27/2025    ALBUMIN 4.1 10/08/2024    AST 29 10/08/2024    ALT 36 (H) 10/08/2024    BILITOT 0.5 10/08/2024       RADIOLOGY:  No radiology results for the last 3 days     I reviewed the patient's new clinical results.    Cancer Staging (if applicable)  Cancer Patient: __ yes __no __unknown; If yes, clinical stage T:__ N:__M:__, stage group or __N/A    Impression: Cosmetic want of breast augmentation    Plan: Breast augmentation      ALLY Tobin   07/03/25   10:09 AM EDT

## 2025-07-03 NOTE — ANESTHESIA POSTPROCEDURE EVALUATION
Patient: Xuan Baron    Procedure Summary       Date: 07/03/25 Room / Location:  [facility] /  [facility]    Anesthesia Start: 1048 Anesthesia Stop: 1243    Procedure: BILATERAL BREAST AUGMENTATION WITH IMPLANTS (Bilateral: Breast) Diagnosis: Encounter for cosmetic procedure    Surgeons: Yonathan Allen MD Provider: Keyur Najera Jr., MD    Anesthesia Type: general ASA Status: 1            Anesthesia Type: general    Vitals  Vitals Value Taken Time   BP     Temp     Pulse 64 07/03/25 12:42   Resp     SpO2 95 % 07/03/25 12:42   Vitals shown include unfiled device data.        Post Anesthesia Care and Evaluation    Patient location during evaluation: PACU  Patient participation: waiting for patient participation  Pain management: adequate    Airway patency: patent  Anesthetic complications: No anesthetic complications  PONV Status: none  Cardiovascular status: hemodynamically stable and acceptable  Respiratory status: nonlabored ventilation, acceptable, nasal cannula and oral airway  Hydration status: acceptable    Comments: /55  HR 70  Sats 100  Temp 97.3

## 2025-07-03 NOTE — ANESTHESIA PROCEDURE NOTES
Airway  Reason: elective    Date/Time: 7/3/2025 10:57 AM  Airway not difficult    General Information and Staff    Patient location during procedure: OR  CRNA/CAA: Natasha Gutierrez CRNA    Indications and Patient Condition  Indications for airway management: airway protection    Preoxygenated: yes  MILS not maintained throughout    Mask difficulty assessment: 1 - vent by mask    Final Airway Details    Final airway type: endotracheal airway      Successful airway: ETT  Cuffed: yes   Successful intubation technique: direct laryngoscopy  Adjuncts used in placement: intubating stylet  Endotracheal tube insertion site: oral  Blade: Juan Jose  Blade size: 3  ETT size (mm): 7.0  Cormack-Lehane Classification: grade I - full view of glottis  Placement verified by: chest auscultation and capnometry   Inital cuff pressure (cm H2O): 4  Measured from: lips  ETT/EBT  to lips (cm): 22  Number of attempts at approach: 1  Assessment: lips, teeth, and gum same as pre-op and atraumatic intubation    Additional Comments  Negative epigastric sounds, Breath sound equal bilaterally with symmetric chest rise and fall. Patient has a sore on bottom right lip at baseline

## 2025-07-03 NOTE — ANESTHESIA PREPROCEDURE EVALUATION
Anesthesia Evaluation     Patient summary reviewed and Nursing notes reviewed   history of anesthetic complications:  PONV  NPO Solid Status: > 8 hours  NPO Liquid Status: > 2 hours           Airway   Mallampati: I  TM distance: >3 FB  Neck ROM: full  No difficulty expected  Dental - normal exam     Comment: Sore on lower lip; patient says she bit her lip    Pulmonary    (-) not a smoker  Cardiovascular - negative cardio ROS  Exercise tolerance: excellent (>7 METS)        Neuro/Psych  (-) seizures, CVA  GI/Hepatic/Renal/Endo    (+) GERD  (-) diabetes, no thyroid disorder    ROS Comment: Esophageal dysphagia  Esophageal spasm        Musculoskeletal     Abdominal    Substance History      OB/GYN          Other        ROS/Med Hx Other: Raynaud's               Anesthesia Plan    ASA 1     general     intravenous induction     Anesthetic plan, risks, benefits, and alternatives have been provided, discussed and informed consent has been obtained with: patient.  Pre-procedure education provided  Plan discussed with CRNA.    CODE STATUS:

## 2025-07-03 NOTE — NURSING NOTE
Caregiver Telephone Number    Phone Number for Ride/Caregiver: MANDEEP ALARCON (SPOUSE) [phone] (MOBILE)     Who will be staying with you post procedure for the next 24 hours? Name and Phone Number?: MANDEEP ALARCON (SPOUSE) [phone] (MOBILE)

## 2025-07-03 NOTE — BRIEF OP NOTE
BREAST AUGMENTATION  Progress Note    Xuan Baron  7/3/2025    Pre-op Diagnosis:   * Encounter for cosmetic procedure [Z41.1]       Post-Op Diagnosis Codes:     * Encounter for cosmetic procedure [Z41.1]    Procedure(s):      Procedure(s):  BILATERAL BREAST AUGMENTATION WITH IMPLANTS              Surgeon(s):  Yonathan Allen MD    Anesthesia: General    Staff:   Circulator: Lissa Ireland RN  Scrub Person: Aliya Molina  Assistant: Maurizio Araujo RNFA  Assistant: Maurizio Araujo RNFA    Estimated Blood Loss: minimal    Urine Voided: * No values recorded between 7/3/2025 12:00 AM and 7/3/2025 10:56 AM *    Specimens:                None      Drains: * No LDAs found *    Findings: hypomastia      Complications: none immediate    Assistant: Maurizio Araujo RNFA  was responsible for performing the following activities: Retraction, Suction, Irrigation, Suturing, Closing, and Placing Dressing and their skilled assistance was necessary for the success of this case.    Yonathan Allen MD     Date: 7/3/2025  Time: 10:56 EDT

## 2025-07-03 NOTE — OP NOTE
Preoperative diagnosis: 1.  Desire for breast enhancement    Postoperative diagnosis: 1.  Desire for breast enhancement    Surgeon: Yonathan Allen MD    Assistant: MAX Cardenas    Anesthesia: General    Procedure: 1.  Bilateral augmentation mammoplasty via a inframammary crease incision into a dual plane type II partial submuscular plane with Sientra HSC smooth round moderate plus profile volume 355 cc serial number on the left [id number] and serial number on the right [id number].    Indication: The patient is a 45-year-old female who presents to the operating room today for the aforementioned procedure.  All techniques, potential complications, typical postoperative course were discussed with the patient.  She indicated her understanding wish to proceed.    Findings: 1.  Bilateral grade 1 breast ptosis  2.  Hypomastia    Description: The patient was taken from preoperative holding to the operating room after informed consent was signed and on the chart and placed under general anesthesia successfully.  Prior to induction of anesthesia, the patient received a prophylactic dose of antibiotics and had bilateral lower extremity sequential compression devices in place and operational.  She was placed supine on the operating room table.  She did pillow placed beneath her knees.  Her arms were gently abducted to 90 degrees and she had ulnar nerve padding.  Her chest wall was prepped and draped in the usual sterile fashion.  After proper identifying the patient the patient's problem, bilateral inframammary crease incisions were marked extending 4-1/2 cm across the breast meridian.  They existed 8.5 cm from the nipple under stretch.  The left breast inframammary crease was noted to be slightly higher than the right side.  The left inframammary crease incision was placed in a symmetrical position to the right side.  Bilateral breast were injected with a lidocaine and epinephrine solution.  A 10 blade was used to incise the  skin on the right side.  The subcutaneous tissue was dissected with electrocautery beveling in a cephalad direction.  The lateral border of the pectoralis major muscle was identified and a submuscular plane was developed.  My dissection was carried out in a cephalad and medial direction.  The inferior origin was taken down to the 5 o'clock position.  My dissection was then carried out in a more cephalad and medial direction taking down the origin on the costal cartilage.  Lastly, my dissection was carried out laterally very judiciously.  Next, a dual plane type II release was undertaken to the level of the nipple.  Next, the superficial fascia along the caudal margin of the inframammary crease incision was plicated to the deep fascia with 2-0 Vicryl suture.  This was done in many locations.  A sizer was placed in the breast pocket and filled with 325 cc of injectable normal saline.  The superficial fascia and skin were then temporary closed with Vicryl suture and staples respectively.  My attention then turned to the left side.  The approach to the chest wall was carried out in a similar fashion.  The pocket was created in a similar fashion.  A dual plane type II release was undertaken in the similar fashion.  The superficial fascia was plicated the deep fascia in a similar fashion.  The sizer was placed in similar fashion.  It was filled to 325 cc of injectable normal saline.  The temporary closure was carried out in a similar fashion.  The patient was sat upright to check for symmetry.  Various volumes were attempted.  Ultimately, for the patient's desires as well as for the appropriate amount of skin stretch, 350 cc of saline was chosen.  Next, the temporary closure and size were taken down and removed respectively on the left side.  The pocket was irrigated with an antibiotic clear solution.  Hemostasis was achieved with electrocautery.  The pocket was then irrigated with a Betadine solution.  The superficial  fascia was tagged with 2-0 Vicryl pop-off sutures.  My gloves were changed.  The planned implant was then inserted in the breast pocket via Keyes funnel and oriented and seated properly.  The Vicryl ties were then tied down.  My attention then turned to the right side.  The temporary closure was released and the sizer was removed.  The pocket was prepared in a similar fashion.  The planned implant was then inserted in the similar fashion.  The Vicryl ties were then tied down.  Simultaneously, the skin was closed with 3-0 Monocryl suture in a deep dermal buried interrupted fashion and 3-0 STRATAFIX suture in an intracuticular fashion.  Tissue glue was applied.  The patient's chest wall was dressed with Kerlix fluffs and a bra.  The case was turned over to anesthesia which point the patient was woken from general anesthesia successfully and taken the PACU in stable condition.  I was present for the entire procedure.  All counts were correct.    Estimated blood loss: Minimal    Drains: None    Complications: None immediate

## (undated) DEVICE — FRCP BIOP COLD ENDOJAW ALLGTR W/NDL 2.8X2300MM BLU

## (undated) DEVICE — SYS SKIN EXOFIN WND CLS 4X22CM

## (undated) DEVICE — HYBRID TUBING/CAP SET FOR OLYMPUS® SCOPES: Brand: ERBE

## (undated) DEVICE — EPF KIT: Brand: ENDOTRAC

## (undated) DEVICE — ELECTRD BLD EZ CLN MOD XLNG 2.75IN

## (undated) DEVICE — SYR LUERLOK 50ML

## (undated) DEVICE — ANTIBACTERIAL UNDYED BRAIDED (POLYGLACTIN 910), SYNTHETIC ABSORBABLE SUTURE: Brand: COATED VICRYL

## (undated) DEVICE — STOCKINETTE,IMPERVIOUS,12X48,STERILE: Brand: MEDLINE

## (undated) DEVICE — GLV SURG SENSICARE PI ORTHO SZ8 LF STRL

## (undated) DEVICE — TRAP FLD MINIVAC MEGADYNE 100ML

## (undated) DEVICE — BANDAGE,GAUZE,BULKEE II,4.5"X4.1YD,STRL: Brand: MEDLINE

## (undated) DEVICE — UNDERGLV SURG BIOGEL INDICATOR LF PF 7.5

## (undated) DEVICE — ENDOSCOPY PORT CONNECTOR FOR OLYMPUS® SCOPES: Brand: ERBE

## (undated) DEVICE — PROXIMATE RH ROTATING HEAD SKIN STAPLERS (35 WIDE) CONTAINS 35 STAINLESS STEEL STAPLES: Brand: PROXIMATE

## (undated) DEVICE — DRESSING, SURESITE SELECT, 2.8'X3.50': Brand: MEDLINE

## (undated) DEVICE — BLANKT WARM UNDER/BDY FUL/ACC A/ 90X206CM

## (undated) DEVICE — PENCL SMOKE/EVAC MEGADYNE TELESCP 10FT

## (undated) DEVICE — PK CHST BRST 10

## (undated) DEVICE — FIAPC® PROBE W/ FILTER 2200 C OD 2.3MM/6.9FR; L 2.2M/7.2FT: Brand: ERBE

## (undated) DEVICE — SUT MNCRYL PLS ANTIB UD 3/0 PS2 27IN

## (undated) DEVICE — BRA COMPR SURG STL958 LG

## (undated) DEVICE — PK EXTRM LOWR 20

## (undated) DEVICE — IRRIGATOR BULB ASEPTO 60CC STRL

## (undated) DEVICE — SYS IRR SURGIPHOR A/MIC RTU BO PVPI 450ML STRL

## (undated) DEVICE — CONMED SCOPE SAVER BITE BLOCK, 20X27 MM: Brand: SCOPE SAVER

## (undated) DEVICE — BLANKT WARM LOWR/BDY 100X120CM

## (undated) DEVICE — PATIENT RETURN ELECTRODE, SINGLE-USE, CONTACT QUALITY MONITORING, ADULT, WITH 9FT CORD, FOR PATIENTS WEIGING OVER 33LBS. (15KG): Brand: MEGADYNE

## (undated) DEVICE — SUCTION CANISTER, 1500CC, RIGID: Brand: DEROYAL

## (undated) DEVICE — VLV 4 NDL/FREE SAFESITE W CAP ASP/INJ

## (undated) DEVICE — BANDAGE,GAUZE,CONFORMING,3"X75",STRL,LF: Brand: MEDLINE

## (undated) DEVICE — GLV SURG SENSICARE W/ALOE PF LF 8 STRL

## (undated) DEVICE — DRSNG WND GZ CURAD OIL EMULSION 3X3IN STRL

## (undated) DEVICE — GLOVE,SURG,SENSICARE,ALOE,LF,PF,7: Brand: MEDLINE

## (undated) DEVICE — Device

## (undated) DEVICE — VLV SXN AIR/H2O ORCAPOD3 1P/U STRL

## (undated) DEVICE — APPL COTN TP PLSTC 6IN STRL LF PK/2

## (undated) DEVICE — SUT ETHLN 4/0 PS2 PLSTC 1667G